# Patient Record
Sex: FEMALE | Race: WHITE | URBAN - METROPOLITAN AREA
[De-identification: names, ages, dates, MRNs, and addresses within clinical notes are randomized per-mention and may not be internally consistent; named-entity substitution may affect disease eponyms.]

---

## 2017-03-03 ENCOUNTER — TRANSFERRED RECORDS (OUTPATIENT)
Dept: HEALTH INFORMATION MANAGEMENT | Facility: CLINIC | Age: 67
End: 2017-03-03

## 2017-04-12 ENCOUNTER — OFFICE VISIT (OUTPATIENT)
Dept: OBGYN | Facility: CLINIC | Age: 67
End: 2017-04-12
Attending: OBSTETRICS & GYNECOLOGY
Payer: MEDICARE

## 2017-04-12 VITALS
SYSTOLIC BLOOD PRESSURE: 138 MMHG | HEIGHT: 67 IN | BODY MASS INDEX: 24.47 KG/M2 | DIASTOLIC BLOOD PRESSURE: 79 MMHG | WEIGHT: 155.9 LBS | HEART RATE: 71 BPM

## 2017-04-12 DIAGNOSIS — Z80.41 FAMILY HISTORY OF MALIGNANT NEOPLASM OF OVARY: Primary | ICD-10-CM

## 2017-04-12 LAB — CANCER AG125 SERPL-ACNC: 7 U/ML (ref 0–30)

## 2017-04-12 PROCEDURE — 86304 IMMUNOASSAY TUMOR CA 125: CPT | Mod: GA | Performed by: OBSTETRICS & GYNECOLOGY

## 2017-04-12 PROCEDURE — 99212 OFFICE O/P EST SF 10 MIN: CPT | Mod: ZF

## 2017-04-12 PROCEDURE — 36415 COLL VENOUS BLD VENIPUNCTURE: CPT | Performed by: OBSTETRICS & GYNECOLOGY

## 2017-04-12 NOTE — PROGRESS NOTES
"[facility] Annual Exam    SUBJECTIVE:  Susan J Kreye is an 67 year old, , who requests an Annual Preventive Exam. Pt denies any new concerns today, she states she is feeling well. She exercises regularly including walking daily, regular light weights, golfing, and leading an active lifestyle in general. She lost her  6 years ago to suicide, but has learned to cope with assistance of therapy and in building a support group with her children and grandchildren. No history of tobacco use and very occasional glass of wine. She has a family history of Ovarian cancer in her mother, who was asymptomatic until she was diagnosed incidentally during an abdominal surgery, and ultimately passed away in her 80's due to the disease. Since this diagnosis, the patient has been undergoing ovarian cancer screening with yearly Ca-125 and pelvic u/s, which have been normal to this point, though she states she is due this year. She has a history of multiple cancers in her family, with her brother with prostate cancer, Maternal grandmother passed from stomach cancer, lung cancer in her father. Genetic testing was discussed on last visit, and the patient today states she is interested in pursuing. She denies any vaginal bleeding, pelvic pain or bloating, GI symptoms, urinary symptoms, cough, chest pain, shortness of breath, any changes with her mood, any SI/HI and denies any other concerns today.     Last    Lab Results   Component Value Date    PAP NIL 01/10/2014     History of abnormal Pap smear: NO - age 63 - see link Cervical Cytology Screening Guidelines, last 3 pap smears were NIL.     Mammogram current: yes  Last Mammogram: Benign mammogram, yearly f/u      Last Colonoscopy:   per pt report w/ no biopsies or concerning findings.     DEXA scan last obtained in , pt currently exercising and supplementing w/ vitamin D and Calcium. Previously she took an \"E\" drug, but cannot remember the name. "     HISTORY:    Current Outpatient Prescriptions on File Prior to Visit:  brimonidine-timolol (COMBIGAN) 0.2-0.5 % ophthalmic solution 1 drop 2 times daily.   Multiple Vitamin (MULTI-VITAMIN PO) Take 1 tablet by mouth daily.   Calcium Carbonate-Vitamin D (CALCIUM + D PO) Take 1,500 mg by mouth daily.   sertraline (ZOLOFT) 50 MG tablet Take 75 mg by mouth daily.     No current facility-administered medications on file prior to visit.   Allergies   Allergen Reactions     Penicillins      Vaginal infections     Immunization History   Administered Date(s) Administered     Influenza (IIV3) 2013     TDAP Vaccine (Adacel) 2014       Obstetric History       T1      TAB0   SAB0   E0   M0   L2      Past Medical History:   Diagnosis Date     Depression      Glaucoma      Migraines      Osteoporosis      Osteoporosis      Past Surgical History:   Procedure Laterality Date     CYSTOCELE REPAIR       EYE SURGERY       OPEN REDUCTION INTERNAL FIXATION ANKLE  2013    Procedure: OPEN REDUCTION INTERNAL FIXATION ANKLE;  OPEN REDUCTION INTERNAL FIXATION LEFT ANKLE TRIMALLEOLAR FRACTURE ;  Surgeon: Rishabh Lantigua MD;  Location:  OR     Family History   Problem Relation Age of Onset     CANCER Mother 83     Ovarian     GASTROINTESTINAL DISEASE Sister      Gallbladder     CANCER Father 60     Lung     CANCER Maternal Grandmother      Bladder     C.A.D. Paternal Grandmother      DIABETES Mother      DM II, controlled with diet     CEREBROVASCULAR DISEASE Paternal Grandfather 88     Prostate Cancer Mother      Prostate Cancer Father      Prostate Cancer Maternal Grandmother      Prostate Cancer Maternal Grandfather      Social History     Social History     Marital status:      Spouse name: N/A     Number of children: N/A     Years of education: N/A     Occupational History           Social History Main Topics     Smoking status: Never Smoker      "Smokeless tobacco: None     Alcohol use 0.0 - 0.5 oz/week     Drug use: No     Sexual activity: No     Other Topics Concern     Blood Transfusions No     Caffeine Concern No     None     Occupational Exposure No     Hobby Hazards No     Sleep Concern No     Stress Concern No     , doing well now     Weight Concern No     Special Diet No     Back Care No     Exercise No     Walks 4x/week     Seat Belt No     Social History Narrative    1/10/14     ( committed suicide in 2010)        How much exercise per week? 4 days    How much calcium per day? supplement       How much caffeine per day? none    How much vitamin D per day? supplement    Do you/your family wear seatbelts?  Yes    Do you/your family use safety helmets? No    Do you/your family use sunscreen? Yes    Do you/your family keep firearms in the home? No    Do you/your family have a smoke detector(s)? Yes        Do you feel safe in your home? Yes    Has anyone ever touched you in an unwanted manner? No     Explain Nan Brown, [facility] 02/04/2015               ROS  ROS negative except for as noted above.       EXAM:  Blood pressure 138/79, pulse 71, height 1.702 m (5' 7\"), weight 70.7 kg (155 lb 14.4 oz). Body mass index is 24.42 kg/(m^2).  General - pleasant female in no acute distress.  Skin - no suspicious lesions or rashes  EENT-  PERRLA, euthyroid with out palpable nodules  Neck - supple without lymphadenopathy.  Lungs - clear to auscultation bilaterally.  Heart - regular rate and rhythm without murmur.  Abdomen - soft, nontender, nondistended, no masses or organomegaly noted.  Musculoskeletal - no gross deformities.  Neurological - normal strength, sensation, and mental status.    Breast Exam:  Breast: Without visible skin changes. No dimpling or lesions seen.   Breasts supple, non-tender with palpation, no dominant mass, nodularity, or nipple discharge noted bilaterally. Axillary nodes negative.      Pelvic Exam:  EG/BUS: Normal genital " architecture without lesions, erythema or abnormal secretions. Normal genital architecture without lesions, erythema or abnormal secretions Bartholin's, Urethra, [place]'s normal   Urethral meatus: normal  Urethra: no masses, tenderness, or scarring  Bladder: no masses or tenderness  Vagina: moist, pink, rugae with creamy, white and odorless  secretions  Cervix: no lesions  Uterus: small, smooth, firm, mobile w/o pain  Adnexa: Within normal limits and No masses, nodularity, tenderness    ASSESSMENT:  Encounter Diagnosis   Name Primary?     Family history of malignant neoplasm of ovary Yes     - Will provide referral to genetic counseling at patient's request to evaluate for risk of hereditary cancer syndromes.    - History of Osteopenia, currently not followed by a physician for this, and is currently exercising and taking Vit D and Calcium. Will refer pt to Dr. Latia Dewey to reestablish care for her osteopenia. Will defer ordering another Dexa scan prior to this meeting.     PLAN:   Orders Placed This Encounter   Procedures     US Pelvic Complete with Transvaginal        - referral to Dr. Latia Dewey  - Referral to genetic counseling [facility]    Return to clinic in one year.  Follow-up as needed.      I, Gavin Centeno, 3rd year medical student am serving as a scribe; to document services personally performed by Dr. Real based on data collection and the provider's statements to me.     The student acted as my scribe. I have seen, examined and counseled the patient. I have reviewed and edited the note.   Sharri Real

## 2017-04-12 NOTE — MR AVS SNAPSHOT
After Visit Summary   4/12/2017    Susan J Kreye    MRN: [id number]           Patient Information     Date Of Birth          1950        Visit Information        Provider Department      4/12/2017 11:00 AM Sharri Real MD [facility]        Today's Diagnoses     Family history of malignant neoplasm of ovary    -  1       Follow-ups after your visit        Additional Services     CANCER RISK MGMT/CANCER GENETIC COUNSELING REFERRAL       Your provider has referred you to the Cancer Risk Management Program - Cancer Genetic Counseling.    Reason for Referral: fam hx ovarian cancer    We have a sent a notice to a staff member of the Cancer Risk Management Program to give you a call to assist with scheduling your appointment.  You may also call  [phone] ([phone]) to initiate scheduling.    Please be aware that coverage of these services is subject to the terms and limitations of your health insurance plan.  Call member services at your health plan with any benefit or coverage questions.      Please bring the completed family history sheet to your appointment in addition to any available outside medical records documenting your cancer diagnosis.                  Who to contact     Please call your clinic at [phone] to:    Ask questions about your health    Make or cancel appointments    Discuss your medicines    Learn about your test results    Speak to your doctor   If you have compliments or concerns about an experience at your clinic, or if you wish to file a complaint, please contact [facility] Patient Relations at [phone] or email us at [email or web address]         Additional Information About Your Visit        MyChart Information     Kwelia is an electronic gateway that provides easy, online access to your medical records. With Kwelia, you can request a clinic appointment, read your test results,  "renew a prescription or communicate with your care team.     To sign up for MyChart visit the website at [email or web address]   You will be asked to enter the access code listed below, as well as some personal information. Please follow the directions to create your username and password.     Your access code is: [email or web address]  Expires: 2017  9:00 AM     Your access code will  in 90 days. If you need help or a new code, please contact your [facility] or call [phone] for assistance.        Care EveryWhere ID     This is your Care EveryWhere ID. This could be used by other organizations to access your [place] medical records  [id number]        Your Vitals Were     Pulse Height BMI (Body Mass Index)             71 1.702 m (5' 7\") 24.42 kg/m2          Blood Pressure from Last 3 Encounters:   17 138/79   02/10/16 131/76   02/04/15 129/71    Weight from Last 3 Encounters:   17 70.7 kg (155 lb 14.4 oz)   02/10/16 68.9 kg (151 lb 12.8 oz)   02/04/15 68.7 kg (151 lb 6.4 oz)              We Performed the Following          CANCER RISK MGMT/CANCER GENETIC COUNSELING REFERRAL        Primary Care Provider    Mulu Amos MD       No address on file        Thank you!     Thank you for choosing [facility]  for your care. Our goal is always to provide you with excellent care. Hearing back from our patients is one way we can continue to improve our services. Please take a few minutes to complete the written survey that you may receive in the mail after your visit with us. Thank you!             Your Updated Medication List - Protect others around you: Learn how to safely use, store and throw away your medicines at www.disposemymeds.org.          This list is accurate as of: 17 11:59 PM.  Always use your most recent med list.                   Brand Name Dispense Instructions for use    brimonidine-timolol 0.2-0.5 % ophthalmic " solution    COMBIGAN     1 drop 2 times daily.       CALCIUM + D PO      Take 1,500 mg by mouth daily.       MULTI-VITAMIN PO      Take 1 tablet by mouth daily.       sertraline 50 MG tablet    ZOLOFT     Take 75 mg by mouth daily.

## 2017-04-12 NOTE — LETTER
2017       RE: Susan J Kreye  [id number] [place] [address]     Dear Colleague,    Thank you for referring your patient, Susan J Kreye, to the [facility] at [facility]. Please see a copy of my visit note below.    [facility] Annual Exam    SUBJECTIVE:  Susan J Kreye is an 67 year old, , who requests an Annual Preventive Exam. Pt denies any new concerns today, she states she is feeling well. She exercises regularly including walking daily, regular light weights, golfing, and leading an active lifestyle in general. She lost her  6 years ago to suicide, but has learned to cope with assistance of therapy and in building a support group with her children and grandchildren. No history of tobacco use and very occasional glass of wine. She has a family history of Ovarian cancer in her mother, who was asymptomatic until she was diagnosed incidentally during an abdominal surgery, and ultimately passed away in her 80's due to the disease. Since this diagnosis, the patient has been undergoing ovarian cancer screening with yearly Ca-125 and pelvic u/s, which have been normal to this point, though she states she is due this year. She has a history of multiple cancers in her family, with her brother with prostate cancer, Maternal grandmother passed from stomach cancer, lung cancer in her father. Genetic testing was discussed on last visit, and the patient today states she is interested in pursuing. She denies any vaginal bleeding, pelvic pain or bloating, GI symptoms, urinary symptoms, cough, chest pain, shortness of breath, any changes with her mood, any SI/HI and denies any other concerns today.     Last    Lab Results   Component Value Date    PAP NIL 01/10/2014     History of abnormal Pap smear: NO - age 63 - see link Cervical Cytology Screening Guidelines, last 3 pap smears were NIL.     Mammogram current: yes  Last Mammogram:  "Benign mammogram, yearly f/u      Last Colonoscopy:   per pt report w/ no biopsies or concerning findings.     DEXA scan last obtained in , pt currently exercising and supplementing w/ vitamin D and Calcium. Previously she took an \"E\" drug, but cannot remember the name.     HISTORY:    Current Outpatient Prescriptions on File Prior to Visit:  brimonidine-timolol (COMBIGAN) 0.2-0.5 % ophthalmic solution 1 drop 2 times daily.   Multiple Vitamin (MULTI-VITAMIN PO) Take 1 tablet by mouth daily.   Calcium Carbonate-Vitamin D (CALCIUM + D PO) Take 1,500 mg by mouth daily.   sertraline (ZOLOFT) 50 MG tablet Take 75 mg by mouth daily.     No current facility-administered medications on file prior to visit.   Allergies   Allergen Reactions     Penicillins      Vaginal infections     Immunization History   Administered Date(s) Administered     Influenza (IIV3) 2013     TDAP Vaccine (Adacel) 2014       Obstetric History       T1      TAB0   SAB0   E0   M0   L2      Past Medical History:   Diagnosis Date     Depression      Glaucoma      Migraines      Osteoporosis      Osteoporosis      Past Surgical History:   Procedure Laterality Date     CYSTOCELE REPAIR  1980     EYE SURGERY       OPEN REDUCTION INTERNAL FIXATION ANKLE  2013    Procedure: OPEN REDUCTION INTERNAL FIXATION ANKLE;  OPEN REDUCTION INTERNAL FIXATION LEFT ANKLE TRIMALLEOLAR FRACTURE ;  Surgeon: Rishabh Lantigua MD;  Location:  OR     Family History   Problem Relation Age of Onset     CANCER Mother 83     Ovarian     GASTROINTESTINAL DISEASE Sister      Gallbladder     CANCER Father 60     Lung     CANCER Maternal Grandmother      Bladder     C.A.D. Paternal Grandmother      DIABETES Mother      DM II, controlled with diet     CEREBROVASCULAR DISEASE Paternal Grandfather 88     Prostate Cancer Mother      Prostate Cancer Father      Prostate Cancer Maternal Grandmother      Prostate Cancer Maternal " "Grandfather      Social History     Social History     Marital status:      Spouse name: N/A     Number of children: N/A     Years of education: N/A     Occupational History           Social History Main Topics     Smoking status: Never Smoker     Smokeless tobacco: None     Alcohol use 0.0 - 0.5 oz/week     Drug use: No     Sexual activity: No     Other Topics Concern     Blood Transfusions No     Caffeine Concern No     None     Occupational Exposure No     Hobby Hazards No     Sleep Concern No     Stress Concern No     , doing well now     Weight Concern No     Special Diet No     Back Care No     Exercise No     Walks 4x/week     Seat Belt No     Social History Narrative    1/10/14     ( committed suicide in 2010)        How much exercise per week? 4 days    How much calcium per day? supplement       How much caffeine per day? none    How much vitamin D per day? supplement    Do you/your family wear seatbelts?  Yes    Do you/your family use safety helmets? No    Do you/your family use sunscreen? Yes    Do you/your family keep firearms in the home? No    Do you/your family have a smoke detector(s)? Yes        Do you feel safe in your home? Yes    Has anyone ever touched you in an unwanted manner? No     Explain Nan Brown, [facility] 02/04/2015               ROS  ROS negative except for as noted above.       EXAM:  Blood pressure 138/79, pulse 71, height 1.702 m (5' 7\"), weight 70.7 kg (155 lb 14.4 oz). Body mass index is 24.42 kg/(m^2).  General - pleasant female in no acute distress.  Skin - no suspicious lesions or rashes  EENT-  PERRLA, euthyroid with out palpable nodules  Neck - supple without lymphadenopathy.  Lungs - clear to auscultation bilaterally.  Heart - regular rate and rhythm without murmur.  Abdomen - soft, nontender, nondistended, no masses or organomegaly noted.  Musculoskeletal - no gross deformities.  Neurological - normal strength, sensation, and mental " status.    Breast Exam:  Breast: Without visible skin changes. No dimpling or lesions seen.   Breasts supple, non-tender with palpation, no dominant mass, nodularity, or nipple discharge noted bilaterally. Axillary nodes negative.      Pelvic Exam:  EG/BUS: Normal genital architecture without lesions, erythema or abnormal secretions. Normal genital architecture without lesions, erythema or abnormal secretions Bartholin's, Urethra, [place]'s normal   Urethral meatus: normal  Urethra: no masses, tenderness, or scarring  Bladder: no masses or tenderness  Vagina: moist, pink, rugae with creamy, white and odorless  secretions  Cervix: no lesions  Uterus: small, smooth, firm, mobile w/o pain  Adnexa: Within normal limits and No masses, nodularity, tenderness    ASSESSMENT:  Encounter Diagnosis   Name Primary?     Family history of malignant neoplasm of ovary Yes     - Will provide referral to genetic counseling at patient's request to evaluate for risk of hereditary cancer syndromes.    - History of Osteopenia, currently not followed by a physician for this, and is currently exercising and taking Vit D and Calcium. Will refer pt to Dr. Latia Dewey to reestablish care for her osteopenia. Will defer ordering another Dexa scan prior to this meeting.     PLAN:   Orders Placed This Encounter   Procedures     US Pelvic Complete with Transvaginal        - referral to Dr. Latia Dewey  - Referral to genetic counseling [facility] cancer center    Return to clinic in one year.  Follow-up as needed.      I, Gavin Centeno, 3rd year medical student am serving as a scribe; to document services personally performed by Dr. Real based on data collection and the provider's statements to me.     The student acted as my scribe. I have seen, examined and counseled the patient. I have reviewed and edited the note.     Sharri Real

## 2017-10-26 ENCOUNTER — ONCOLOGY VISIT (OUTPATIENT)
Dept: ONCOLOGY | Facility: CLINIC | Age: 67
End: 2017-10-26
Attending: GENETIC COUNSELOR, MS
Payer: MEDICARE

## 2017-10-26 DIAGNOSIS — Z80.41 FAMILY HISTORY OF MALIGNANT NEOPLASM OF OVARY: Primary | ICD-10-CM

## 2017-10-26 DIAGNOSIS — Z80.42 FAMILY HISTORY OF PROSTATE CANCER: ICD-10-CM

## 2017-10-26 DIAGNOSIS — Z80.0 FAMILY HISTORY OF STOMACH CANCER: ICD-10-CM

## 2017-10-26 DIAGNOSIS — Z80.8 FAMILY HISTORY OF THYROID CANCER: ICD-10-CM

## 2017-10-26 LAB — MISCELLANEOUS TEST: NORMAL

## 2017-10-26 PROCEDURE — 36415 COLL VENOUS BLD VENIPUNCTURE: CPT

## 2017-10-26 PROCEDURE — 96040 ZZH GENETIC COUNSELING, EACH 30 MINUTES: CPT | Performed by: GENETIC COUNSELOR, MS

## 2017-10-26 NOTE — MR AVS SNAPSHOT
After Visit Summary   10/26/2017    Susan J Kreye    MRN: [id number]           Patient Information     Date Of Birth          1950        Visit Information        Provider Department      10/26/2017 11:15 AM [facility]ellen Moss [facility] Risk Management [facility]        Today's Diagnoses     Family history of malignant neoplasm of ovary    -  1    Family history of prostate cancer        Family history of thyroid cancer        Family history of stomach cancer          Care Instructions          Assessing Cancer Risk  Only about 5-10% of cancers are thought to be due to an inherited cancer susceptibility gene.    These families often have:    Several people with the same or related types of cancer    Cancers diagnosed at a young age (before age 50)    Individuals with more than one primary cancer    Multiple generations of the family affected with cancer    Some people may be candidates for genetic testing of more than one gene.  For these families, genetic testing using a cancer panel may be offered.  These panels can test many genes at once known to increase the risk for gynecologic (and other) cancers:  BRCA1, BRCA2, BRIP1 MLH1, MSH2, MSH6, PMS2, EPCAM, PTEN, PALB2, RAD51C, RAD51D, and TP53. The purpose of this handout is to serve as a brief summary of the gynecologic cancer risk genes that have published clinical management guidelines for individuals who are found to carry a mutation.    ______________________________________________________________________________  Hereditary Breast and Ovarian Cancer Syndrome   (BRCA1 and BRCA2)  A single mutation in one of the copies of BRCA1 or BRCA2 increases the risk for breast and ovarian cancer, among others.  The risk for pancreatic cancer and melanoma may also be slightly increased in some families.  The chart below shows the chance that someone with a BRCA mutation would develop cancer in his or her lifetime1,2,3,4.        A person s ethnic background is also  important to consider, as individuals of Ashkenazi Gnosticism ancestry have a higher chance of having a BRCA gene mutation.  There are three BRCA mutations that occur more frequently in this population.    Taveras Syndrome   (MLH1, MSH2, MSH6, PMS2, and EPCAM)  Currently five genes are known to cause Taveras Syndrome: MLH1, MSH2, MSH6, PMS2, and EPCAM.  A single mutation in one of the Taveras Syndrome genes increases the risk for colon, endometrial, ovarian, and stomach cancers.  Other cancers that occur less commonly in Taveras Syndrome include urinary tract, skin, and brain cancers.  The chart below shows the chance that a person with Taveras syndrome would develop cancer in his or her lifetime7.      *Cancer risk varies depending on Taveras syndrome gene found    Cowden Syndrome   (PTEN)  Cowden syndrome is a hereditary condition that increases the risk for breast, thyroid, endometrial, and kidney cancer.  Cowden syndrome is caused by a mutation in the PTEN gene.  A single mutation in one of the copies of PTEN causes Cowden syndrome and increases cancer risk.  The chart below shows the chance that someone with a PTEN mutation would develop cancer in their lifetime5,6.  Other benign features seen in some individuals with Cowden syndrome include benign skin lesions (facial papules, keratoses, lipomas), learning disability, autism, thyroid nodules, colon polyps, and larger head size.      *One recent study found breast cancer risk to be increased to 85%  Li-Fraumeni Syndrome   (TP53)  Li-Fraumeni Syndrome (LFS) is a cancer predisposition syndrome caused by a mutation in the TP53 gene. A single mutation in one of the copies of TP53 increases the risk for multiple cancers. Individuals with LFS are at an increased risk for developing cancer at a young age. The general lifetime risk for development of cancer is 50% by age 30 and 90% by age 60.     Core Cancers: Sarcomas, Breast, Brain, Lung, Leukemias/Lymphomas, Adrenocortical  carcinomas  Other Cancers: Gastrointestinal, Thyroid, Skin, Genitourinary    Additional Genes  PALB2  Mutations in PALB2 have been shown to increase the risk of breast cancer up to 33-58% in some families; where individuals fall within this risk range is dependent upon family history. PALB2 mutations have also been associated with increased risk for pancreatic cancer, although this risk has not been quantified yet.  Individuals who inherit two PALB2 mutations--one from their mother and one from their father--have a condition called Fanconi Anemia.  This rare autosomal recessive condition is associated with short stature, developmental delay, bone marrow failure, and increased risk for childhood cancers.    BRIP1, RAD51C and RAD51D  Mutations in BRIP1, RAD51C, and RAD51D have been shown to increase the risk of ovarian cancer as well as female breast cancer.    ______________________________________________________________________________    Inheritance  All of the cancer syndromes reviewed above are inherited in an autosomal dominant pattern.  This means that if a parent has a mutation, each of his or her children will have a 50% chance of inheriting that same mutation.  Therefore, each child--male or female--would have a 50% chance of being at increased risk for developing cancer.      Image obtained from Genetics Home Reference, 2013     Mutations in some genes can occur de hanna, which means that a person s mutation occurred for the first time in them and was not inherited from a parent.  Now that they have the mutation, however, it can be passed on to future generations.    Genetic Testing  Genetic testing involves a blood test and will look for any harmful mutations that are associated with increased cancer risk.  If possible, it is recommended that the person(s) who has had cancer be tested before other family members.  That person will give us the most useful information about whether or not a specific gene is  associated with the cancer in the family.    Results  There are three possible results of genetic testing:    Positive--a harmful mutation was identified in one or more of the genes    Negative--no mutation was identified in any of the 9 genes on this panel    Variant of unknown significance--a variation in one of the genes was identified, but it is unclear how this impacts cancer risk in the family    Advantages and Disadvantages   There are advantages and disadvantages to genetic testing.  Advantages    May clarify your cancer risk    Can help you make medical decisions    May explain the cancers in your family    May give useful information to your family members (if you share your results)    Disadvantages    Possible negative emotional impact of learning about inherited cancer risk    Uncertainty in interpreting a negative test result in some situations    Possible genetic discrimination concerns (see below)    Genetic Information Nondiscrimination Act (LUIS)  LUIS is a federal law that protects individuals from health insurance or employment discrimination based on a genetic test result alone.  Although rare, there are currently no legal discrimination protections in terms of life insurance, long term care, or disability insurances.  Visit the National Human Genome Research [place] website to learn more.    Reducing Cancer Risk  Each of the genes listed within this handout have nationally recognized cancer screening guidelines that would be recommended for individuals who test positive.  In addition to increased cancer screening, surgeries may be offered or recommended to reduce cancer risk.  Recommendations are based upon an individual s genetic test result as well as their personal and family history of cancer.    Questions to Think About Regarding Genetic Testing:    What effect will the test result have on me and my relationship with my family members if I have an inherited gene mutation?  If I don t  have a gene mutation?    Should I share my test results, and how will my family react to this news, which may also affect them?    Are my children ready to learn new information that may one day affect their own health?        Hereditary Cancer Resources    FORCE: Facing Our Risk of Cancer Empowered [email or web address]   Bright Pink bebrightpink.org   Li-Fraumeni Syndrome Association lfsassociation.org   PTEN World PTENworld.com   Collaborative Group of the Americas on Inherited Colorectal Cancer (CGA) cgaicc.com http://www.facingourrisk.org/   Cancer Care cancercare.org   American Cancer Society (ACS) cancer.org   National Cancer [place] (NCI) cancer.gov       Cancer Risk Management Program [phone] ([phone])  ? [place] [facility] [place]  [phone]  ? Nan [place]cadio[place] [facility] [place]  [phone]  ? Beckie [place] [facility] [place]  [phone]  ? [place] [facility] [place]  [phone]   ? [place]ina Hutchison[place] [facility] [place]  [phone]    References  1. Raimundo A, Whitney PDP, Narsara S, Risch KHAN, Marlena JE, Rohini JL, Maryman N, Edith H, Maura O, Lisa A, Mednez B, Susan P, Bimal S, Jonathan DM, Jose N, Héctor E, Yanira H, Moon E, Lubinski J, Gronwald J, Tamika B, Mason H, Thorlacius S, Eerola H, Kt H, Terry K, Laura OP. Average risks of breast and ovarian cancer associated with BRCA1 or BRCA2 mutations detected in case series unselected for family history: a combined analysis of 222 studies. Am J Hum Elsa. 2003;72:1117-30.  2. Neva LE, Fang HAMILTON, Gerardo G.  BRCA1 and BRCA2 Hereditary Breast and Ovarian Cancer. Gene Reviews online. 2013.  3. Ralph YC, Gustavo S, Hetal G, Mendez S. Breast cancer risk among male BRCA1 and BRCA2 mutation carriers. J Natl Cancer Inst. 2007;99:1811-4.  4. Robert DG, Vivienne I, Justyn J, Dorcas E, Marivel ER, Lakshmi F. Risk of breast cancer in male BRCA2 carriers. J Med Elsa. 2010;47:710-1.  5. Rebel LAINEZ, Grady J, Ranulfo J, Michael JARVIS, Lissett PHILLIPS, Tammy C. Lifetime cancer  "risks in individuals with germline PTEN mutations. Clin Cancer Res. 2012;18:400-7.  6. Shane CARTAGENA. Cowden Syndrome: A Critical Review of the Clinical Literature. J Elsa . 2009:18:13-27.  7. National Comprehensive Cancer Network. Clinical practice guidelines in oncology, colorectal cancer screening. Available online (registration required). 2015.  8. Raimundo HEWITT et al. Breast-Cancer Risk in Families with Mutations in PALB2. NEJM. 2014; 371(6):497-506.                Follow-ups after your visit        Who to contact     If you have questions or need follow up information about today's clinic visit or your schedule please contact CANCER RISK MANAGEMENT PROGRAM directly at [phone].  Normal or non-critical lab and imaging results will be communicated to you by [employer], letter or phone within 4 business days after the clinic has received the results. If you do not hear from us within 7 days, please contact the clinic through [employer] or phone. If you have a critical or abnormal lab result, we will notify you by phone as soon as possible.  Submit refill requests through imoji or call your pharmacy and they will forward the refill request to us. Please allow 3 business days for your refill to be completed.          Additional Information About Your Visit        imoji Information     imoji lets you send messages to your doctor, view your test results, renew your prescriptions, schedule appointments and more. To sign up, go to [email or web address] . Click on \"Log in\" on the left side of the screen, which will take you to the Welcome page. Then click on \"Sign up Now\" on the right side of the page.     You will be asked to enter the access code listed below, as well as some personal information. Please follow the directions to create your username and password.     Your access code is: [email or web address]  Expires: 2018 12:20 PM     Your access code will  in 90 days. If you need help or a new code, " please call your [place] clinic or [phone].        Care EveryWhere ID     This is your Care EveryWhere ID. This could be used by other organizations to access your [place] medical records  [id number]         Blood Pressure from Last 3 Encounters:   04/12/17 138/79   02/10/16 131/76   02/04/15 129/71    Weight from Last 3 Encounters:   04/12/17 70.7 kg (155 lb 14.4 oz)   02/10/16 68.9 kg (151 lb 12.8 oz)   02/04/15 68.7 kg (151 lb 6.4 oz)              We Performed the Following     [facility]: Laboratory Miscellaneous Order        Primary Care Provider    None Specified       No primary provider on file.        Equal Access to Services     CRISTA SAUER : Hadsweta Rodríguez, wajorge lda luanamadaha, qaybta kaalmada jackyyasyeda, shabana méndez . So [facility] [phone].    ATENCIÓN: Si habla español, tiene a laguerre disposición servicios gratuitos de asistencia lingüística. Llame al [phone].    We comply with applicable federal civil rights laws and [place] laws. We do not discriminate on the basis of race, color, national origin, age, disability, sex, sexual orientation, or gender identity.            Thank you!     Thank you for choosing CANCER RISK MANAGEMENT PROGRAM  for your care. Our goal is always to provide you with excellent care. Hearing back from our patients is one way we can continue to improve our services. Please take a few minutes to complete the written survey that you may receive in the mail after your visit with us. Thank you!             Your Updated Medication List - Protect others around you: Learn how to safely use, store and throw away your medicines at www.disposemymeds.org.          This list is accurate as of: 10/26/17 12:20 PM.  Always use your most recent med list.                   Brand Name Dispense Instructions for use Diagnosis    brimonidine-timolol 0.2-0.5 % ophthalmic solution    COMBIGAN     1 drop 2 times daily.        CALCIUM + D PO       Take 1,500 mg by mouth daily.        MULTI-VITAMIN PO      Take 1 tablet by mouth daily.        sertraline 50 MG tablet    ZOLOFT     Take 75 mg by mouth daily.

## 2017-10-26 NOTE — PATIENT INSTRUCTIONS
Assessing Cancer Risk  Only about 5-10% of cancers are thought to be due to an inherited cancer susceptibility gene.    These families often have:    Several people with the same or related types of cancer    Cancers diagnosed at a young age (before age 50)    Individuals with more than one primary cancer    Multiple generations of the family affected with cancer    Some people may be candidates for genetic testing of more than one gene.  For these families, genetic testing using a cancer panel may be offered.  These panels can test many genes at once known to increase the risk for gynecologic (and other) cancers:  BRCA1, BRCA2, BRIP1 MLH1, MSH2, MSH6, PMS2, EPCAM, PTEN, PALB2, RAD51C, RAD51D, and TP53. The purpose of this handout is to serve as a brief summary of the gynecologic cancer risk genes that have published clinical management guidelines for individuals who are found to carry a mutation.    ______________________________________________________________________________  Hereditary Breast and Ovarian Cancer Syndrome   (BRCA1 and BRCA2)  A single mutation in one of the copies of BRCA1 or BRCA2 increases the risk for breast and ovarian cancer, among others.  The risk for pancreatic cancer and melanoma may also be slightly increased in some families.  The chart below shows the chance that someone with a BRCA mutation would develop cancer in his or her lifetime1,2,3,4.        A person s ethnic background is also important to consider, as individuals of Ashkenazi [place] ancestry have a higher chance of having a BRCA gene mutation.  There are three BRCA mutations that occur more frequently in this population.    Taveras Syndrome   (MLH1, MSH2, MSH6, PMS2, and EPCAM)  Currently five genes are known to cause Taveras Syndrome: MLH1, MSH2, MSH6, PMS2, and EPCAM.  A single mutation in one of the Taveras Syndrome genes increases the risk for colon, endometrial, ovarian, and stomach cancers.  Other cancers that occur  less commonly in Tavreas Syndrome include urinary tract, skin, and brain cancers.  The chart below shows the chance that a person with Taveras syndrome would develop cancer in his or her lifetime7.      *Cancer risk varies depending on Taveras syndrome gene found    Cowden Syndrome   (PTEN)  Cowden syndrome is a hereditary condition that increases the risk for breast, thyroid, endometrial, and kidney cancer.  Cowden syndrome is caused by a mutation in the PTEN gene.  A single mutation in one of the copies of PTEN causes Cowden syndrome and increases cancer risk.  The chart below shows the chance that someone with a PTEN mutation would develop cancer in their lifetime5,6.  Other benign features seen in some individuals with Cowden syndrome include benign skin lesions (facial papules, keratoses, lipomas), learning disability, autism, thyroid nodules, colon polyps, and larger head size.      *One recent study found breast cancer risk to be increased to 85%  Li-Fraumeni Syndrome   (TP53)  Li-Fraumeni Syndrome (LFS) is a cancer predisposition syndrome caused by a mutation in the TP53 gene. A single mutation in one of the copies of TP53 increases the risk for multiple cancers. Individuals with LFS are at an increased risk for developing cancer at a young age. The general lifetime risk for development of cancer is 50% by age 30 and 90% by age 60.     Core Cancers: Sarcomas, Breast, Brain, Lung, Leukemias/Lymphomas, Adrenocortical carcinomas  Other Cancers: Gastrointestinal, Thyroid, Skin, Genitourinary    Additional Genes  PALB2  Mutations in PALB2 have been shown to increase the risk of breast cancer up to 33-58% in some families; where individuals fall within this risk range is dependent upon family history. PALB2 mutations have also been associated with increased risk for pancreatic cancer, although this risk has not been quantified yet.  Individuals who inherit two PALB2 mutations--one from their mother and one from their  father--have a condition called Fanconi Anemia.  This rare autosomal recessive condition is associated with short stature, developmental delay, bone marrow failure, and increased risk for childhood cancers.    BRIP1, RAD51C and RAD51D  Mutations in BRIP1, RAD51C, and RAD51D have been shown to increase the risk of ovarian cancer as well as female breast cancer.    ______________________________________________________________________________    Inheritance  All of the cancer syndromes reviewed above are inherited in an autosomal dominant pattern.  This means that if a parent has a mutation, each of his or her children will have a 50% chance of inheriting that same mutation.  Therefore, each child--male or female--would have a 50% chance of being at increased risk for developing cancer.      Image obtained from Genetics Home Reference, 2013     Mutations in some genes can occur de hanna, which means that a person s mutation occurred for the first time in them and was not inherited from a parent.  Now that they have the mutation, however, it can be passed on to future generations.    Genetic Testing  Genetic testing involves a blood test and will look for any harmful mutations that are associated with increased cancer risk.  If possible, it is recommended that the person(s) who has had cancer be tested before other family members.  That person will give us the most useful information about whether or not a specific gene is associated with the cancer in the family.    Results  There are three possible results of genetic testing:    Positive--a harmful mutation was identified in one or more of the genes    Negative--no mutation was identified in any of the 9 genes on this panel    Variant of unknown significance--a variation in one of the genes was identified, but it is unclear how this impacts cancer risk in the family    Advantages and Disadvantages   There are advantages and disadvantages to genetic  testing.  Advantages    May clarify your cancer risk    Can help you make medical decisions    May explain the cancers in your family    May give useful information to your family members (if you share your results)    Disadvantages    Possible negative emotional impact of learning about inherited cancer risk    Uncertainty in interpreting a negative test result in some situations    Possible genetic discrimination concerns (see below)    Genetic Information Nondiscrimination Act (LUIS)  LUIS is a federal law that protects individuals from health insurance or employment discrimination based on a genetic test result alone.  Although rare, there are currently no legal discrimination protections in terms of life insurance, long term care, or disability insurances.  Visit the [employer] [place] website to learn more.    Reducing Cancer Risk  Each of the genes listed within this handout have nationally recognized cancer screening guidelines that would be recommended for individuals who test positive.  In addition to increased cancer screening, surgeries may be offered or recommended to reduce cancer risk.  Recommendations are based upon an individual s genetic test result as well as their personal and family history of cancer.    Questions to Think About Regarding Genetic Testing:    What effect will the test result have on me and my relationship with my family members if I have an inherited gene mutation?  If I don t have a gene mutation?    Should I share my test results, and how will my family react to this news, which may also affect them?    Are my children ready to learn new information that may one day affect their own health?        Hereditary Cancer Resources    FORCE: Facing Our Risk of Cancer Empowered [email or web address]   Bright Pink bebrightpink.org   Li-Fraumeni Syndrome Association lfsassociation.org   PTEN World PTENworld.com   Collaborative Group of the Americas on Inherited  Colorectal Cancer (CGA) [email or web address]   Cancer Care [email or web address]   American Cancer Society (ACS) cancer.org   National Cancer [place] (NCI) cancer.gov       Cancer Risk Management Program [phone]-CANCER ([phone])  ? Mitra Loaiza, [place], [facility] [place]  [phone]  ? Nan Ervin, MS, [facility] [place]  [phone]  ? Beckie Masters, [place], [facility] [place]  [phone]  ? Suellen Moss, [place], [facility] [place]  [phone]   ? Medina Hutchison, MS, [facility]  [phone]    References  1. Raimundo A, Whitney PDP, Praful S, Inez KHAN, Marlena JE, Rohini JL, Jasper N, Edith H, Maura O, Lisa A, Mendez B, Susan P, Manalonso S, Jonathan DM, Jose N, Héctor E, Yanira H, Red E, Kasandra J, Kristel J, Tamika B, Mason H, Thorlacius S, Eerola H, Nevryliena H, Terry K, Laura OP. Average risks of breast and ovarian cancer associated with BRCA1 or BRCA2 mutations detected in case series unselected for family history: a combined analysis of 222 studies. Am J Hum Elsa. 2003;72:1117-30.  2. Neva N, Fang M, Carrillo G.  BRCA1 and BRCA2 Hereditary Breast and Ovarian Cancer. Gene Reviews online. 2013.  3. Ralph YC, Gustavo S, Hetal G, Mendez S. Breast cancer risk among male BRCA1 and BRCA2 mutation carriers. J Natl Cancer Inst. 2007;99:1811-4.  4. Robert DG, Vivienne I, Justyn J, Dorcas E, Marivel ER, Lakshmi F. Risk of breast cancer in male BRCA2 carriers. J Med Elsa. 2010;47:710-1.  5. Rebel MH, Grady J, Ranulfo J, Michael LA, Lissett MS, Eng C. Lifetime cancer risks in individuals with germline PTEN mutations. Clin Cancer Res. 2012;18:400-7.  6. Shane CARTAGENA. Cowden Syndrome: A Critical Review of the Clinical Literature. J Elsa . 2009:18:13-27.  7. National Comprehensive Cancer Network. Clinical practice guidelines in oncology, colorectal cancer screening. Available online (registration required). 2015.  8. Raimundo HEWITT et al. Breast-Cancer Risk in Families with Mutations in PALB2. NEJM. 2014; 371(6):497-506.

## 2017-10-26 NOTE — PROGRESS NOTES
Medical Assistant Note:  Susan J Kreye presents today for labs.    Patient seen by provider today: Yes: .   present during visit today: Not Applicable.    Concerns: No Concerns.    Procedure:  Lab draw site: LAC, Needle type: BF, Gauge: 21.    Post Assessment:  Labs drawn without difficulty: Yes.    Discharge Plan:  Departure Mode: Ambulatory.    Face to Face Time: 5 minutes.    Haydee Nguyen MA

## 2017-10-26 NOTE — LETTER
Cancer Risk Management  Program Locations    [facility] Cancer [facility] Cancer [facility]  Mailing Address  Cancer Risk Management Program  [facility]  [address]  [place] [address]    New patient appointments  [phone]  October 31, 2017    Susan J Kreye  [id number]   [place] [address]      Dear Cheryl,    It was a pleasure meeting with you at the [facility] on 10/26/2017.  Here is a copy of the progress note from your recent genetic counseling visit to the Cancer Risk Management Program.  If you have any additional questions, please feel free to call.    Referring Provider: Sharri Real MD    Presenting Information:   I met with Cheryl Casanovamarino today for genetic counseling at the Cancer Risk Management Program at the [facility] to discuss her family history of ovarian cancer.  She is here today to review this history, cancer screening recommendations, and available genetic testing options.    Personal History:  Cheryl is a 67 year old female.  She does not have any personal history of cancer.      She had her first menstrual period at age 11, her first child at age 35, and went through menopause in her mid 50's.  Cheryl has her ovaries, fallopian tubes and uterus in place, and she has had annual ovarian cancer screening including CA-125 and transvaginal ultrasounds (most recent in April of 2017 were normal).  She reports a history of estrogen hormone replacement therapy for 5-10 years.      Cheryl's most recent OB-GYN exam and Pap smear in 2017 were normal.  She has annual clinical breast exams and mammograms, and her most recent mammogram in March of 2017 was benign.  Her most recent colonoscopy reportedly within the past 5 years was normal, and follow-up was recommended in 10 years.  She reports  no history of colon polyps. Cheryl reports seeing a dermatologist annually, and has a history of one benign mole.  Cheryl reported no tobacco use.    Family History: (Please see scanned pedigree for detailed family history information)    Cheryl's daughter was diagnosed with papillary thyroid cancer recently at age 32    Her brother was diagnosed with prostate cancer at 54 and is now 58    Her mother was diagnosed with ovarian cancer at age 82 and passed away at 87    Her maternal grandmother was diagnosed with reportedly stomach cancer in her 30's and passed away at 96    Her maternal grandfather was diagnosed with prostate cancer in his 90's and has passed away    Cheryl's father had a history of lung cancer and passed away at 60.  He had significant exposure to smoking and asbestos.      Her maternal ethnicity is [place] and possibly Ashkenazi Protestant. Her paternal ethnicity is [place] and Polish.     Discussion:    Cheryl's family history of ovarian cancer may be suggestive of a possible cancer susceptibility gene in the family. Given her mother's history of ovarian cancer, we discussed Taveras syndrome and Hereditary Breast and Ovarian Cancer syndrome as the two most likely hereditary explanations.    We reviewed the features of sporadic, familial, and hereditary cancers.  Based on her family history, Cheryl meets current National Comprehensive Cancer Network (NCCN) criteria for genetic testing of BRCA1 and BRCA2.    We discussed the natural history and genetics of Hereditary Breast and Ovarian cancer syndrome due to BRCA1 and BRCA2 gene mutations. We also discussed the natural history and genetics of Taveras syndrome due to mismatch repair gene mutations (MLH1, MSH2, MSH6, PMS2, EPCAM). A detailed handout regarding hereditary gynecologic cancer risk genes and the information we discussed was provided to Cheryl at the end of our appointment today and can be found in the after visit summary.  Topics included: inheritance  pattern, cancer risks, cancer screening recommendations, and also risks, benefits and limitations of testing.    We discussed that there are additional genes that could cause increased risk for ovarian cancer.  As many of these genes present with overlapping features in a family and accurate cancer risk cannot always be established based upon the pedigree analysis alone, it would be reasonable for Cheryl to consider panel genetic testing to analyze multiple genes at once.    We discussed that genetic testing for ovarian cancer susceptibility genes is typically most informative when it is first performed on a family member with a personal history of ovarian or a related cancer. Testing is available to Cheryl, but with limitations. If Cheryl pursues testing at this time and receives a negative result, this does not rule out the possibility of a hereditary cancer syndrome in her and/or her family. Despite these limitations, Cheryl expressed interest in proceeding with testing.    We reviewed genetic testing options for hereditary gynecologic cancers: actionable high/moderate risk panel testing (GYNplus, 13 genes) and expanded high and moderate risk panel testing (OvaNext, 25 genes).  Cheryl expressed an interest in learning as much information as possible from the testing. She opted for the OvaNext panel.  Genetic testing is available for 25 genes associated with hereditary gynecologic cancers: OvaNext (ASHLEY, BARD1, BRCA1, BRCA2, BRIP1, CDH1, CHEK2, DICER1, EPCAM, MLH1, MRE11A, MSH2, MSH6, MUTYH, NBN, NF1, PALB2, PMS2, PTEN, RAD50, RAD51C, RAD51D, SMARCA4, STK11, and TP53).  We discussed that some of the genes in the OvaNext panel are associated with specific hereditary cancer syndromes and have published management guidelines: Hereditary Breast and Ovarian Cancer syndrome (BRCA1, BRCA2), Taveras syndrome (MLH1, MSH2, MSH6, PMS2, EPCAM), Hereditary Diffuse Gastric Cancer (CDH1), Cowden syndrome (PTEN), Li Fraumeni syndrome  (TP53), Peutz-Jeghers syndrome (STK11), MUTYH Associated Polyposis syndrome (MUTYH), and Neurofibromatosis type 1 (NF1).    Risk-reducing salpingo-oophorectomy can be considered in women with mutations in BRIP1, RAD51C, or RAD51D.  Breast cancer risk management guidelines are available for ASHLEY, CHEK2, PALB2, NF1, and NBN.  The remaining genes (BARD1, DICER1, MRE11A, RAD50, and SMARCA4) are associated with increased cancer risk and may allow us to make medical recommendations when mutations are identified.    Cheryl was provided with a detailed brochure from [facility] explaining the OvaNext testing.  Consent was obtained and genetic testing for OvaNext was sent to [facility]. Turn around time: approximately 3-4 weeks.    Medical Management: For Cheryl, we reviewed that the information from genetic testing may determine:    additional cancer screening for which Cheryl may qualify (i.e. mammogram and breast MRI, more frequent colonoscopies, more frequent dermatologic exams, etc.),    options for risk reducing surgeries Cheryl could consider (i.e. bilateral mastectomy, surgery to remove the ovaries and/or uterus, etc.)      These recommendations will be discussed in detail once genetic testing is completed.     Plan:  1) Today Cheryl elected to proceed with the OvaNext panel offered through [facility].  2) This information should be available in 3-4 weeks.  3) Cheryl will return to clinic to discuss the results    Suellen Moss MS, [facility]  Certified Genetic Counselor  [phone]

## 2017-10-26 NOTE — LETTER
10/26/2017         RE: Susan J Kreye  [id number]   [place] [address]        Dear Colleague,    Thank you for referring your patient, Susan J Kreye, to the CANCER RISK MANAGEMENT PROGRAM. Please see a copy of my visit note below.    10/26/2017    Referring Provider: Sharri Real MD    Presenting Information:   I met with Susan Kreye today for genetic counseling at the Cancer Risk Management Program at the [facility] to discuss her family history of ovarian cancer.  She is here today to review this history, cancer screening recommendations, and available genetic testing options.    Personal History:  Cheryl is a 67 year old female.  She does not have any personal history of cancer.      She had her first menstrual period at age 11, her first child at age 35, and went through menopause in her mid 50's.  Cheryl has her ovaries, fallopian tubes and uterus in place, and she has had annual ovarian cancer screening including CA-125 and transvaginal ultrasounds (most recent in April of 2017 were normal).  She reports a history of estrogen hormone replacement therapy for 5-10 years.      Cheryl's most recent OB-GYN exam and Pap smear in 2017 were normal.  She has annual clinical breast exams and mammograms, and her most recent mammogram in March of 2017 was benign.  Her most recent colonoscopy reportedly within the past 5 years was normal, and follow-up was recommended in 10 years.  She reports no history of colon polyps. Cheryl reports seeing a dermatologist annually, and has a history of one benign mole.  Cheryl reported no tobacco use.    Family History: (Please see scanned pedigree for detailed family history information)    Cheryl's daughter was diagnosed with papillary thyroid cancer recently at age 32    Her brother was diagnosed with prostate cancer at 54 and is now 58    Her mother was diagnosed with ovarian cancer at age 82 and passed away at 87    Her maternal grandmother was  diagnosed with reportedly stomach cancer in her 30's and passed away at 96    Her maternal grandfather was diagnosed with prostate cancer in his 90's and has passed away    Cheryl's father had a history of lung cancer and passed away at 60.  He had significant exposure to smoking and asbestos.      Her maternal ethnicity is [place]ian and possibly Ashkenazi Sabianist. Her paternal ethnicity is [place]ian and Polish.     Discussion:    Cheryl's family history of ovarian cancer may be suggestive of a possible cancer susceptibility gene in the family. Given her mother's history of ovarian cancer, we discussed Taveras syndrome and Hereditary Breast and Ovarian Cancer syndrome as the two most likely hereditary explanations.    We reviewed the features of sporadic, familial, and hereditary cancers.  Based on her family history, Cheryl meets current National Comprehensive Cancer Network (NCCN) criteria for genetic testing of BRCA1 and BRCA2.    We discussed the natural history and genetics of Hereditary Breast and Ovarian cancer syndrome due to BRCA1 and BRCA2 gene mutations. We also discussed the natural history and genetics of Taveras syndrome due to mismatch repair gene mutations (MLH1, MSH2, MSH6, PMS2, EPCAM). A detailed handout regarding hereditary gynecologic cancer risk genes and the information we discussed was provided to Cheryl at the end of our appointment today and can be found in the after visit summary.  Topics included: inheritance pattern, cancer risks, cancer screening recommendations, and also risks, benefits and limitations of testing.    We discussed that there are additional genes that could cause increased risk for ovarian cancer.  As many of these genes present with overlapping features in a family and accurate cancer risk cannot always be established based upon the pedigree analysis alone, it would be reasonable for Cheryl to consider panel genetic testing to analyze multiple genes at once.    We discussed that  genetic testing for ovarian cancer susceptibility genes is typically most informative when it is first performed on a family member with a personal history of ovarian or a related cancer. Testing is available to Cheryl, but with limitations. If Cheryl pursues testing at this time and receives a negative result, this does not rule out the possibility of a hereditary cancer syndrome in her and/or her family. Despite these limitations, Cheryl expressed interest in proceeding with testing.    We reviewed genetic testing options for hereditary gynecologic cancers: actionable high/moderate risk panel testing (GYNplus, 13 genes) and expanded high and moderate risk panel testing (OvaNext, 25 genes).  Cheryl expressed an interest in learning as much information as possible from the testing. She opted for the OvaNext panel.  Genetic testing is available for 25 genes associated with hereditary gynecologic cancers: OvaNext (ASHLEY, BARD1, BRCA1, BRCA2, BRIP1, CDH1, CHEK2, DICER1, EPCAM, MLH1, MRE11A, MSH2, MSH6, MUTYH, NBN, NF1, PALB2, PMS2, PTEN, RAD50, RAD51C, RAD51D, SMARCA4, STK11, and TP53).  We discussed that some of the genes in the OvaNext panel are associated with specific hereditary cancer syndromes and have published management guidelines: Hereditary Breast and Ovarian Cancer syndrome (BRCA1, BRCA2), Taveras syndrome (MLH1, MSH2, MSH6, PMS2, EPCAM), Hereditary Diffuse Gastric Cancer (CDH1), Cowden syndrome (PTEN), Li Fraumeni syndrome (TP53), Peutz-Jeghers syndrome (STK11), MUTYH Associated Polyposis syndrome (MUTYH), and Neurofibromatosis type 1 (NF1).    Risk-reducing salpingo-oophorectomy can be considered in women with mutations in BRIP1, RAD51C, or RAD51D.  Breast cancer risk management guidelines are available for ASHLEY, CHEK2, PALB2, NF1, and NBN.  The remaining genes (BARD1, DICER1, MRE11A, RAD50, and SMARCA4) are associated with increased cancer risk and may allow us to make medical recommendations when mutations are  identified.    Cheryl was provided with a detailed brochure from [facility] explaining the OvaNext testing.  Consent was obtained and genetic testing for OvaNext was sent to [facility]. Turn around time: approximately 3-4 weeks.    Medical Management: For Cheryl, we reviewed that the information from genetic testing may determine:    additional cancer screening for which Cheryl may qualify (i.e. mammogram and breast MRI, more frequent colonoscopies, more frequent dermatologic exams, etc.),    options for risk reducing surgeries Cheryl could consider (i.e. bilateral mastectomy, surgery to remove the ovaries and/or uterus, etc.)      These recommendations will be discussed in detail once genetic testing is completed.     Plan:  1) Today Cheryl elected to proceed with the OvaNext panel offered through [facility].  2) This information should be available in 3-4 weeks.  3) Cheryl will return to clinic to discuss the results    Face to face time: 45 minutes    Suellen Moss MS, [facility]  Certified Genetic Counselor  [phone]                Medical Assistant Note:  Susan J Kreye presents today for labs.    Patient seen by provider today: Yes: .   present during visit today: Not Applicable.    Concerns: No Concerns.    Procedure:  Lab draw site: LAC, Needle type: BF, Gauge: 21.    Post Assessment:  Labs drawn without difficulty: Yes.    Discharge Plan:  Departure Mode: Ambulatory.    Face to Face Time: 5 minutes.    Haydee Nguyen MA              Again, thank you for allowing me to participate in the care of your patient.        Sincerely,        Suellen Moss GC

## 2017-11-19 LAB — LAB SCANNED RESULT: ABNORMAL

## 2017-11-21 ENCOUNTER — THERAPY VISIT (OUTPATIENT)
Dept: PHYSICAL THERAPY | Facility: CLINIC | Age: 67
End: 2017-11-21
Payer: MEDICARE

## 2017-11-21 DIAGNOSIS — M77.8 SHOULDER TENDONITIS, RIGHT: Primary | ICD-10-CM

## 2017-11-21 DIAGNOSIS — S76.001S: ICD-10-CM

## 2017-11-21 PROCEDURE — G8978 MOBILITY CURRENT STATUS: HCPCS | Mod: GP

## 2017-11-21 PROCEDURE — 97110 THERAPEUTIC EXERCISES: CPT | Mod: GP

## 2017-11-21 PROCEDURE — G8979 MOBILITY GOAL STATUS: HCPCS | Mod: GP

## 2017-11-21 PROCEDURE — 97161 PT EVAL LOW COMPLEX 20 MIN: CPT | Mod: GP

## 2017-11-21 ASSESSMENT — ACTIVITIES OF DAILY LIVING (ADL)
STEPPING_UP_AND_DOWN_CURBS: SLIGHT DIFFICULTY
WALKING_DOWN_STEEP_HILLS: SLIGHT DIFFICULTY
SITTING_FOR_15_MINUTES: NO DIFFICULTY AT ALL
HEAVY_WORK: MODERATE DIFFICULTY
STANDING_FOR_15_MINUTES: NO DIFFICULTY AT ALL
HOS_ADL_SCORE(%): 70.31
HOW_WOULD_YOU_RATE_YOUR_CURRENT_LEVEL_OF_FUNCTION_DURING_YOUR_USUAL_ACTIVITIES_OF_DAILY_LIVING_FROM_0_TO_100_WITH_100_BEING_YOUR_LEVEL_OF_FUNCTION_PRIOR_TO_YOUR_HIP_PROBLEM_AND_0_BEING_THE_INABILITY_TO_PERFORM_ANY_OF_YOUR_USUAL_DAILY_ACTIVITIES?: 50
WALKING_UP_STEEP_HILLS: SLIGHT DIFFICULTY
WALKING_APPROXIMATELY_10_MINUTES: SLIGHT DIFFICULTY
WALKING_INITIALLY: NO DIFFICULTY AT ALL
HOS_ADL_ITEM_SCORE_TOTAL: 45
PUTTING_ON_SOCKS_AND_SHOES: NO DIFFICULTY AT ALL
RECREATIONAL_ACTIVITIES: MODERATE DIFFICULTY
DEEP_SQUATTING: EXTREME DIFFICULTY
ROLLING_OVER_IN_BED: SLIGHT DIFFICULTY
GETTING_INTO_AND_OUT_OF_AN_AVERAGE_CAR: SLIGHT DIFFICULTY
TWISTING/PIVOTING_ON_INVOLVED_LEG: SLIGHT DIFFICULTY
HOS_ADL_COUNT: 16
LIGHT_TO_MODERATE_WORK: NO DIFFICULTY AT ALL
HOS_ADL_HIGHEST_POTENTIAL_SCORE: 64
GOING_DOWN_1_FLIGHT_OF_STAIRS: MODERATE DIFFICULTY
WALKING_15_MINUTES_OR_GREATER: SLIGHT DIFFICULTY
GOING_UP_1_FLIGHT_OF_STAIRS: MODERATE DIFFICULTY

## 2017-11-21 NOTE — PROGRESS NOTES
Subjective:    Patient is a 67 year old female presenting with rehab right hip hpi and rehab right shoulder hpi.   Susan J Kreye is a 67 year old female with a right hip condition.  Condition occurred with:  Insidious onset.  Condition occurred: for unknown reasons.  This is a new condition  Onset: 10/21/17.  Thinks it might be due to going up and down stairs with 1 year old grand daughter.  Feels pulling along outside of right leg; also hurts at night ans steps; .      Radiates to:  Hip and gluteals.  Pain is described as aching and is intermittent and reported as 5/10.     Symptoms are exacerbated by ascending stairs, descending stairs, lying on extremity and bending/squatting and relieved by analgesics and rest.  Since onset symptoms are gradually worsening.  Special tests:  X-ray.                                  Susan J Kreye is a 67 year old female with a right shoulder condition.        Onset: July 1, 2017.  Thinks it might be due to golfing but also picking up grand-daughter.  CC: aches; posterior shoulder.  Especially with raising arm; .    Patient reports pain:  Posterior.  Radiates to:  Shoulder.  Pain is described as aching and is intermittent and reported as 5/10.   Worse during: N/A.  Symptoms are exacerbated by lifting, using arm at shoulder level, certain positions, using arm behind back, carrying and lying on extremity and relieved by analgesics (avoidance).  Since onset symptoms are unchanged.  Special tests:  X-ray.                                                    Objective:    System                   Shoulder Evaluation:  ROM:  AROM:    Flexion:  Left:  170    Right:  130    Abduction:  Right:  125    Internal Rotation:  Right:  45  External Rotation:  Right:  110                      Strength:    Flexion: Left:5-/5   Pain:    Right: 4+/5      Pain:  +    Abduction:  Left: 5-/5  Pain:    Right: 4+/5      Pain:+    Internal Rotation:  Left:5-/5     Pain:    Right: 5-/5     Pain:  External  Rotation:   Left:5-/5     Pain:   Right:4+/5      Pain:+              Special Tests:  Special tests assessed shoulder: Possible RCT.      Palpation:  Palpation assessed shoulder: TTP coracoid process.    Right shoulder tenderness present at: Infraspinatus and Teres Minor                          Hip Evaluation  HIP AROM:  : WNL- but pain at end range R hip ER.                                     General     ROS    Assessment/Plan:      Patient is a 67 year old female with right side shoulder and right side hip complaints.    Patient has the following significant findings with corresponding treatment plan.                Diagnosis 1:  R shoulder pain with S&Sx suggesting possible RCT  Decreased ROM/flexibility - manual therapy and therapeutic exercise  Decreased strength - therapeutic exercise and therapeutic activities  Impaired muscle performance - neuro re-education  Decreased function - therapeutic activities  Diagnosis 2:  R lateral hip pain, s&sx consistent with possible gluteal tendonopathy and bursitis and associated proximal weakness   Pain -  US, self management, education and home program  Decreased strength - therapeutic exercise and therapeutic activities  Impaired muscle performance - neuro re-education  Decreased function - therapeutic activities    Therapy Evaluation Codes:   1) History comprised of:   Personal factors that impact the plan of care:      None.    Comorbidity factors that impact the plan of care are:      None.     Medications impacting care: None.  2) Examination of Body Systems comprised of:   Body structures and functions that impact the plan of care:      Hip and Shoulder.   Activity limitations that impact the plan of care are:      Lifting, Squatting/kneeling, Stairs and Standing.  3) Clinical presentation characteristics are:   Stable/Uncomplicated.  4) Decision-Making    Low complexity using standardized patient assessment instrument and/or measureable assessment of functional  outcome.  Cumulative Therapy Evaluation is: Low complexity.    Previous and current functional limitations:  (See Goal Flow Sheet for this information)    Short term and Long term goals: (See Goal Flow Sheet for this information)     Communication ability:  Patient appears to be able to clearly communicate and understand verbal and written communication and follow directions correctly.  Treatment Explanation - The following has been discussed with the patient:   RX ordered/plan of care  Anticipated outcomes  Possible risks and side effects  This patient would benefit from PT intervention to resume normal activities.   Rehab potential is good.    Frequency:  1 X week, once daily  Duration:  for 8 weeks  Discharge Plan:  Achieve all LTG.  Independent in home treatment program.  Reach maximal therapeutic benefit.    Please refer to the daily flowsheet for treatment today, total treatment time and time spent performing 1:1 timed codes.

## 2017-11-21 NOTE — LETTER
DEPARTMENT OF HEALTH AND HUMAN SERVICES  [facility]ENT[facility] FOR MEDICARE & MEDICAID SERVICES    PLAN/UPDATED PLAN OF PROGRESS FOR OUTPATIENT REHABILITATION    PATIENTS NAME:  Kreye, Susan     : 1950    PROVIDER NUMBER:    [id number]    [facility]    PROVIDER NAME: [facility]    MEDICAL RECORD NUMBER: [id number]     START OF CARE DATE:  SOC Date: 17   TYPE:  PT    PRIMARY/TREATMENT DIAGNOSIS: (Pertinent Medical Diagnosis)     Shoulder tendonitis, right  Unspecified injury of muscle, fascia and tendon of right hip, sequela    VISITS FROM START OF CARE:  Rxs Used: 1     Subjective:    Patient is a 67 year old female presenting with rehab right hip hpi and rehab right shoulder hpi.   Susan J Kreye is a 67 year old female with a right hip condition.  Condition occurred with:  Insidious onset.  Condition occurred: for unknown reasons.  This is a new condition  Onset: 10/21/17.  Thinks it might be due to going up and down stairs with 1 year old grand daughter.  Feels pulling along outside of right leg; also hurts at night ans steps; .    Radiates to:  Hip and gluteals.  Pain is described as aching and is intermittent and reported as 5/10.     Symptoms are exacerbated by ascending stairs, descending stairs, lying on extremity and bending/squatting and relieved by analgesics and rest.  Since onset symptoms are gradually worsening.  Special tests:  X-ray.                      Susan J Kreye is a 67 year old female with a right shoulder condition.        Onset: 2017.  Thinks it might be due to golfing but also picking up grand-daughter.  CC: aches; posterior shoulder.  Especially with raising arm; .    Patient reports pain:  Posterior.  Radiates to:  Shoulder.  Pain is described as aching and is intermittent and reported as 5/10.   Worse during: N/A.  Symptoms are exacerbated by lifting, using arm at shoulder level, certain positions, using arm behind  back, carrying and lying on extremity and relieved by analgesics (avoidance).  Since onset symptoms are unchanged.  Special tests:  X-ray.                      Pertinent medical history includes:  Osteoporosis, history of fractures and depression.  Medical allergies: yes (Penecillin ).  Other surgeries include:  Orthopedic surgery (Broken ankle ).  Current medications:  Anti-depressants and other (Glaucoma).  Current occupation is Real Estate             PATIENTS NAME:  Kreye, Susan     : 1950                          Objective:       Shoulder Evaluation:  ROM:  AROM:    Flexion:  Left:  170    Right:  130  Abduction:  Right:  125  Internal Rotation:  Right:  45  External Rotation:  Right:  110  Strength:    Flexion: Left:5-/5   Pain:    Right: 4+/5      Pain:  +  Abduction:  Left: 5-/5  Pain:    Right: 4+/5      Pain:+  Internal Rotation:  Left:5-/5     Pain:    Right: 5-/5     Pain:  External Rotation:   Left:5-/5     Pain:   Right:4+/5      Pain:+    Special Tests:  Special tests assessed shoulder: Possible RCT.  Palpation:  Palpation assessed shoulder: TTP coracoid process.  Right shoulder tenderness present at: Infraspinatus and Teres Minor  Hip Evaluation  HIP AROM:  : WNL- but pain at end range R hip ER.    Assessment/Plan:      Patient is a 67 year old female with right side shoulder and right side hip complaints.    Patient has the following significant findings with corresponding treatment plan.                Diagnosis 1:  R shoulder pain with S&Sx suggesting possible RCT  Decreased ROM/flexibility - manual therapy and therapeutic exercise  Decreased strength - therapeutic exercise and therapeutic activities  Impaired muscle performance - neuro re-education  Decreased function - therapeutic activities  Diagnosis 2:  R lateral hip pain, s&sx consistent with possible gluteal tendonopathy and bursitis and associated proximal weakness   Pain -  US, self management, education and home  program  Decreased strength - therapeutic exercise and therapeutic activities  Impaired muscle performance - neuro re-education  Decreased function - therapeutic activities                          PATIENTS NAME:  Kreye, Susan     : 1950                Therapy Evaluation Codes:   1) History comprised of:   Personal factors that impact the plan of care:      None.    Comorbidity factors that impact the plan of care are:      None.     Medications impacting care: None.  2) Examination of Body Systems comprised of:   Body structures and functions that impact the plan of care:      Hip and Shoulder.   Activity limitations that impact the plan of care are:      Lifting, Squatting/kneeling, Stairs and Standing.  3) Clinical presentation characteristics are:   Stable/Uncomplicated.  4) Decision-Making    Low complexity using standardized patient assessment instrument and/or measureable assessment of functional outcome.  Cumulative Therapy Evaluation is: Low complexity.  Previous and current functional limitations:  (See Goal Flow Sheet for this information)    Short term and Long term goals: (See Goal Flow Sheet for this information)   Communication ability:  Patient appears to be able to clearly communicate and understand verbal and written communication and follow directions correctly.  Treatment Explanation - The following has been discussed with the patient:   RX ordered/plan of care  Anticipated outcomes  Possible risks and side effects  This patient would benefit from PT intervention to resume normal activities.   Rehab potential is good.  Frequency:  1 X week, once daily  Duration:  for 8 weeks  Discharge Plan:  Achieve all LTG.  Independent in home treatment program.  Reach maximal therapeutic benefit.                        PATIENTS NAME:  Kreye, Susan     : 1950            Caregiver Signature/Credentials _____________________________ Date ________       Treating Provider: Korey Henry PT      I  "have reviewed and certified the need for these services and plan of treatment while under my care.        PHYSICIAN'S SIGNATURE:   _________________________________________  Date___________   Divya Hanks MD     Certification period:  Beginning of Cert date period: 11/21/17 to  End of Cert period date: 02/18/18     Functional Level Progress Report: Please see attached \"Goal Flow sheet for Functional level.\"    ____X____ Continue Services or       ________ DC Services                Service dates: From  SOC Date: 11/21/17 date to present                         "

## 2017-11-21 NOTE — MR AVS SNAPSHOT
After Visit Summary   11/21/2017    Susan J Kreye    MRN: [id number]           Patient Information     Date Of Birth          1950        Visit Information        Provider Department      11/21/2017 11:10 AM Korey Henry PT [facility] [place] [facility]        Today's Diagnoses     Shoulder tendonitis, right    -  1    Unspecified injury of muscle, fascia and tendon of right hip, sequela           Follow-ups after your visit        Your next 10 appointments already scheduled     Nov 28, 2017  9:30 AM CST   [facility] Extremity with Bimal Noyola PT   [facility] [place] [facility] ([facility] [place])    [address] [place] [address]  [address]  [place] [address]   [phone]            Dec 06, 2017 11:00 AM CST   [place] Extremity with Korey Henry PT   [facility] [place] [facility] ([facility] [place])    [address] [place] [address]  [address]  [place] [address]   [phone]            Dec 07, 2017 10:15 AM CST   Return Visit with Suellen Moss GC   Cancer Risk Management Program ([facility])    [facility]  [address]  [place] [address]   [phone]            Dec 12, 2017 10:30 AM CST   KAL[place]NA Extremity with Korey Henry PT   [facility] [place] [facility] ([facility] [place])    [address] [place] [address] [address]  [place] [address]   [phone]            Dec 20, 2017 11:00 AM CST   [place] Extremity with Korey Henry PT   [facility] [place] [facility] ([facility] [place])    [address] [place] [address] [address]  [place] [address] [phone]              Who to contact     If you have questions or need follow up information about today's clinic visit or your schedule please contact [facility] [place] [facility] directly at [phone].  Normal or non-critical lab  "and imaging results will be communicated to you by MyChart, letter or phone within 4 business days after the clinic has received the results. If you do not hear from us within 7 days, please contact the clinic through [employer] or phone. If you have a critical or abnormal lab result, we will notify you by phone as soon as possible.  Submit refill requests through [facility] or call your pharmacy and they will forward the refill request to us. Please allow 3 business days for your refill to be completed.          Additional Information About Your Visit        [employer] Information     [facility] lets you send messages to your doctor, view your test results, renew your prescriptions, schedule appointments and more. To sign up, go to [email or web address] . Click on \"Log in\" on the left side of the screen, which will take you to the Welcome page. Then click on \"Sign up Now\" on the right side of the page.     You will be asked to enter the access code listed below, as well as some personal information. Please follow the directions to create your username and password.     Your access code is: [email or web address]  Expires: 2018 11:20 AM     Your access code will  in 90 days. If you need help or a new code, please call your [facility] clinic or [phone].        Care EveryWhere ID     This is your Care EveryWhere ID. This could be used by other organizations to access your [facility] medical records  [id number]         Blood Pressure from Last 3 Encounters:   17 138/79   02/10/16 131/76   02/04/15 129/71    Weight from Last 3 Encounters:   17 70.7 kg (155 lb 14.4 oz)   02/10/16 68.9 kg (151 lb 12.8 oz)   02/04/15 68.7 kg (151 lb 6.4 oz)              We Performed the Following     HC PT EVAL, LOW COMPLEXITY     KALPANA INITIAL EVAL REPORT     THERAPEUTIC EXERCISES        Primary Care Provider    Physician No Ref-Primary       NO REF-PRIMARY PHYSICIAN        Equal Access to Services     CRISTA SAUER AH: Hadii aad ku " jason Rodríguez, ashada anabellayosef, ahsan kathao huerta, shabana emin hayaan toanspring innapaulina lalashawnmerari gloria. So [facility] [phone].    ATENCIÓN: Si habla mariangelañol, tiene a laguerre disposición servicios gratuitos de asistencia lingüística. Reynaldo al [phone].    We comply with applicable federal civil rights laws and [place] laws. We do not discriminate on the basis of race, color, national origin, age, disability, sex, sexual orientation, or gender identity.            Thank you!     Thank you for choosing [facility]  for your care. Our goal is always to provide you with excellent care. Hearing back from our patients is one way we can continue to improve our services. Please take a few minutes to complete the written survey that you may receive in the mail after your visit with us. Thank you!             Your Updated Medication List - Protect others around you: Learn how to safely use, store and throw away your medicines at www.disposemymeds.org.          This list is accurate as of: 11/21/17 12:37 PM.  Always use your most recent med list.                   Brand Name Dispense Instructions for use Diagnosis    brimonidine-timolol 0.2-0.5 % ophthalmic solution    COMBIGAN     1 drop 2 times daily.        CALCIUM + D PO      Take 1,500 mg by mouth daily.        MULTI-VITAMIN PO      Take 1 tablet by mouth daily.        sertraline 50 MG tablet    ZOLOFT     Take 75 mg by mouth daily.

## 2017-11-27 NOTE — PROGRESS NOTES
Subjective:    Patient is a 67 year old female presenting with rehab left ankle/foot hpi.                                      Pertinent medical history includes:  Osteoporosis, history of fractures and depression.  Medical allergies: yes (Penecillin ).  Other surgeries include:  Orthopedic surgery (Broken ankle ).  Current medications:  Anti-depressants and other (Glaucoma).  Current occupation is Real Estate       .                                    Objective:    System    Physical Exam    General     ROS    Assessment/Plan:

## 2017-11-28 ENCOUNTER — THERAPY VISIT (OUTPATIENT)
Dept: PHYSICAL THERAPY | Facility: CLINIC | Age: 67
End: 2017-11-28
Payer: MEDICARE

## 2017-11-28 DIAGNOSIS — S76.001S: ICD-10-CM

## 2017-11-28 DIAGNOSIS — M77.8 SHOULDER TENDONITIS, RIGHT: ICD-10-CM

## 2017-11-28 PROCEDURE — 97112 NEUROMUSCULAR REEDUCATION: CPT | Mod: GP | Performed by: PHYSICAL THERAPIST

## 2017-11-28 PROCEDURE — 97110 THERAPEUTIC EXERCISES: CPT | Mod: GP | Performed by: PHYSICAL THERAPIST

## 2017-12-07 ENCOUNTER — ONCOLOGY VISIT (OUTPATIENT)
Dept: ONCOLOGY | Facility: CLINIC | Age: 67
End: 2017-12-07
Attending: GENETIC COUNSELOR, MS
Payer: MEDICARE

## 2017-12-07 DIAGNOSIS — Z80.0 FAMILY HISTORY OF STOMACH CANCER: ICD-10-CM

## 2017-12-07 DIAGNOSIS — Z80.41 FAMILY HISTORY OF MALIGNANT NEOPLASM OF OVARY: Primary | ICD-10-CM

## 2017-12-07 DIAGNOSIS — Z80.8 FAMILY HISTORY OF THYROID CANCER: ICD-10-CM

## 2017-12-07 DIAGNOSIS — Z80.42 FAMILY HISTORY OF PROSTATE CANCER: ICD-10-CM

## 2017-12-07 PROCEDURE — 96040 ZZH GENETIC COUNSELING, EACH 30 MINUTES: CPT | Performed by: GENETIC COUNSELOR, MS

## 2017-12-07 NOTE — PROGRESS NOTES
"12/7/2017    Referring Provider: Sharri Real MD    Presenting Information:  Cheryl returned to the Cancer Risk Management Program at the [facility] to discuss her genetic testing results. Her blood was drawn on 10/26/2017.  OvaNext testing  was ordered from [facility]. This testing was done because of her family history of ovarian and prostate cancer.    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  No other mutations were found in the PMS2 gene.      Of note, Cheryl tested negative for mutations in the following genes by sequencing and deletion/duplication analysis: ASHLEY, BARD1, BRCA1, BRCA2, BRIP1, CDH1, CHEK2, DICER1, EPCAM, MLH1, MRE11A, MSH2, MSH6, MUTYH, NBN, NF1, PALB2, PMS2, PTEN, RAD50, RAD51C, RAD51D, SMARCA4, STK11, and TP53.  We reviewed the autosomal dominant inheritance of these genes.  Cheryl cannot pass on a mutation in any of these genes to her children based on this test result.  Mutations in these genes do not skip generations.      A copy of the test report can be found in the Laboratory tab, dated 10/26/2017, and named \"SEND OUTS MISC TEST\". The report is scanned in as a linked document.    Interpretation:  We discussed several different interpretations of this inconclusive test result.  It is not clear if this variant in the PMS2 gene is associated with increased cancer risk.  1. This variant may be a benign change that does not increase cancer risk.  2. This variant may be a harmful mutation that causes Taveras syndrome.    PMS2 mutations increase the lifetime risk of: colon cancer (15-20%), uterine cancer (15%), and other cancers.  There is a combined risk for stomach, ovarian hepatobiliary tract, urinary tract, small bowel, brain, and pancreatic cancer is 6% by age 70.     If individuals are found to have a mutation in the PMS2 gene, we would " recommend increased cancer screening at younger ages.  Risk reducing surgeries are also available options that can prevent the development of certain cancers.    The lab is working to determine if this variant is harmful or benign, and they will contact me if it is reclassified.  If this variant is determined to be a benign change, there may be a different gene or combination of genes and environment that are associated with the cancers in Cheryl's relatives.      It is also important to consider that her mother may have had a mutation in one of the genes tested and she did not inherit it.      Inheritance:  We reviewed the autosomal dominant inheritance of this variant in the PMS2 gene.  We discussed that Cheryl has a 50% chance to pass this variant to her children.   Likewise, each of her siblings also has a 50% risk of having the same variant.  Because it is unclear what, if any, risk is associated with this variant, clinical genetic testing is not recommended for relatives.    Family Studies:  The laboratory may offer additional research based testing for specific relatives in order to help reclassify this variant.    Screening:  Based on this inconclusive test result, it is important for Cheryl and her relatives to refer back to the family history for appropriate cancer screening.      Due to her mother's history of ovarian cancer, Cheryl and her close female relatives remain at slightly increased risk for ovarian cancer.  We discussed available ovarian cancer screening (pelvic exams, CA-125 blood tests, and transvaginal ultrasounds) as well as the significant limitations of this screening.  As such, this screening is not typically recommended.  That being said, women in Cheryl's family should discuss this screening and the signs and symptoms of ovarian cancer with their primary OB/GYN provider, as they may have individualized recommendations.     Other population cancer screening, such as recommendations by the  American Cancer Society, are also appropriate for Cheryl at this time.  These screening recommendations may change if there are changes to Cheryl's personal and/or family history.  Final screening recommendations should be made by each individual's managing physician.     Summary:  We do not have an explanation for her family history of cancer.  Because of that, it is important that she continue with cancer screening based on her personal and family history as discussed above.    Genetic testing is rapidly advancing, and new cancer susceptibility genes will most likely be identified in the future.  Therefore, I encouraged Cheryl to contact me annually or if there are changes in her personal or family history.  This may change how we assess her cancer risk, screening, and the testing we would offer.    Plan:  1.  I provided Cheryl with a copy of her test results today.    2. She plans to follow-up with her primary care provider.  3. She should contact me annually, or sooner if family history changes.  4. I will contact Cheryl if the lab informs me that this VUS has been reclassified.  This may change screening and testing recommendations for relatives.    If Cheryl has any further questions, I encouraged her to contact me at [phone].      Time spent face to face: 20 minutes.    Suellen Moss MS, [facility]  Certified Genetic Counselor  [phone]

## 2017-12-07 NOTE — LETTER
Cancer Risk Management  Program [facility]  Mailing Address  Cancer Risk Management Program  [address]  [place] [address]    New patient appointments  [phone]  December 12, 2017    Susan J Kreye  [id number]   [place] [address]      Dear Cheryl,    It was a pleasure meeting with you again at the [facility].  Here is a copy of the progress note from your recent genetic counseling visit to the Cancer Risk Management Program on 12/7/2017.  If you have any additional questions, please feel free to call.    Referring Provider: Sharri Real MD    Presenting Information:  Cheryl returned to the Cancer Risk Management Program at the [facility] to discuss her genetic testing results. Her blood was drawn on 10/26/2017.  OvaNext testing  was ordered from [facility]. This testing was done because of her family history of ovarian and prostate cancer.    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  No other mutations were found in the PMS2 gene.      Of note, Cheryl tested negative for mutations in the following genes by sequencing and deletion/duplication analysis: ASHLEY, BARD1, BRCA1, BRCA2, BRIP1, CDH1, CHEK2, DICER1, EPCAM, MLH1, MRE11A, MSH2, MSH6, MUTYH, NBN, NF1, PALB2, PMS2, PTEN, RAD50, RAD51C, RAD51D, SMARCA4, STK11, and TP53.  We reviewed the autosomal dominant inheritance of these genes.  Cheryl cannot pass on a mutation in any of these genes to her children based on this test result.  Mutations in these genes do not skip generations.      Interpretation:  We discussed several different interpretations of  this inconclusive test result.  It is not clear if this variant in the PMS2 gene is associated with increased cancer risk.  1. This variant may be a benign change that does not increase cancer risk.  2. This variant may be a harmful mutation that causes Taveras syndrome.    PMS2 mutations increase the lifetime risk of: colon cancer (15-20%), uterine cancer (15%), and other cancers.  There is a combined risk for stomach, ovarian hepatobiliary tract, urinary tract, small bowel, brain, and pancreatic cancer is 6% by age 70.     If individuals are found to have a mutation in the PMS2 gene, we would recommend increased cancer screening at younger ages.  Risk reducing surgeries are also available options that can prevent the development of certain cancers.    The lab is working to determine if this variant is harmful or benign, and they will contact me if it is reclassified.  If this variant is determined to be a benign change, there may be a different gene or combination of genes and environment that are associated with the cancers in Cheryl's relatives.      It is also important to consider that her mother may have had a mutation in one of the genes tested and she did not inherit it.      Inheritance:  We reviewed the autosomal dominant inheritance of this variant in the PMS2 gene.  We discussed that Cheryl has a 50% chance to pass this variant to her children.   Likewise, each of her siblings also has a 50% risk of having the same variant.  Because it is unclear what, if any, risk is associated with this variant, clinical genetic testing is not recommended for relatives.    Family Studies:  The laboratory may offer additional research based testing for specific relatives in order to help reclassify this variant.    Screening:  Based on this inconclusive test result, it is important for Cheryl and her relatives to refer back to the family history for appropriate cancer screening.      Due to her mother's history of ovarian  cancer, Cheryl and her close female relatives remain at slightly increased risk for ovarian cancer.  We discussed available ovarian cancer screening (pelvic exams, CA-125 blood tests, and transvaginal ultrasounds) as well as the significant limitations of this screening.  As such, this screening is not typically recommended.  That being said, women in Cheryl's family should discuss this screening and the signs and symptoms of ovarian cancer with their primary OB/GYN provider, as they may have individualized recommendations.     Other population cancer screening, such as recommendations by the American Cancer Society, are also appropriate for Cheryl at this time.  These screening recommendations may change if there are changes to Cheryl's personal and/or family history.  Final screening recommendations should be made by each individual's managing physician.     Summary:  We do not have an explanation for her family history of cancer.  Because of that, it is important that she continue with cancer screening based on her personal and family history as discussed above.    Genetic testing is rapidly advancing, and new cancer susceptibility genes will most likely be identified in the future.  Therefore, I encouraged Cheryl to contact me annually or if there are changes in her personal or family history.  This may change how we assess her cancer risk, screening, and the testing we would offer.    Plan:  1.  I provided Cheryl with a copy of her test results today.    2. She plans to follow-up with her primary care provider.  3. She should contact me annually, or sooner if family history changes.  4. I will contact Cheryl if the lab informs me that this VUS has been reclassified.  This may change screening and testing recommendations for relatives.    If Cheryl has any further questions, I encouraged her to contact me at [phone].    Suellen Moss MS, [facility]  Certified Genetic Counselor  [phone]

## 2017-12-07 NOTE — LETTER
"    12/7/2017         RE: Susan J Kreye  [id number]   [place] [address]        Dear Colleague,    Thank you for referring your patient, Susan J Kreye, to the CANCER RISK MANAGEMENT PROGRAM. Please see a copy of my visit note below.    12/7/2017    Referring Provider: Sharri Real MD    Presenting Information:  Cheryl returned to the Cancer Risk Management Program at the [facility] to discuss her genetic testing results. Her blood was drawn on 10/26/2017.  OvaNext testing  was ordered from [facility]. This testing was done because of her family history of ovarian and prostate cancer.    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  No other mutations were found in the PMS2 gene.      Of note, Cheryl tested negative for mutations in the following genes by sequencing and deletion/duplication analysis: ASHLEY, BARD1, BRCA1, BRCA2, BRIP1, CDH1, CHEK2, DICER1, EPCAM, MLH1, MRE11A, MSH2, MSH6, MUTYH, NBN, NF1, PALB2, PMS2, PTEN, RAD50, RAD51C, RAD51D, SMARCA4, STK11, and TP53.  We reviewed the autosomal dominant inheritance of these genes.  Cheryl cannot pass on a mutation in any of these genes to her children based on this test result.  Mutations in these genes do not skip generations.      A copy of the test report can be found in the Laboratory tab, dated 10/26/2017, and named \"[facility]\". The report is scanned in as a linked document.    Interpretation:  We discussed several different interpretations of this inconclusive test result.  It is not clear if this variant in the PMS2 gene is associated with increased cancer risk.  1. This variant may be a benign change that does not increase cancer risk.  2. This variant may be a harmful mutation that causes Taveras syndrome.    PMS2 mutations increase the lifetime risk of: colon cancer (15-20%), uterine " cancer (15%), and other cancers.  There is a combined risk for stomach, ovarian hepatobiliary tract, urinary tract, small bowel, brain, and pancreatic cancer is 6% by age 70.     If individuals are found to have a mutation in the PMS2 gene, we would recommend increased cancer screening at younger ages.  Risk reducing surgeries are also available options that can prevent the development of certain cancers.    The lab is working to determine if this variant is harmful or benign, and they will contact me if it is reclassified.  If this variant is determined to be a benign change, there may be a different gene or combination of genes and environment that are associated with the cancers in Cheryl's relatives.      It is also important to consider that her mother may have had a mutation in one of the genes tested and she did not inherit it.      Inheritance:  We reviewed the autosomal dominant inheritance of this variant in the PMS2 gene.  We discussed that Cheryl has a 50% chance to pass this variant to her children.   Likewise, each of her siblings also has a 50% risk of having the same variant.  Because it is unclear what, if any, risk is associated with this variant, clinical genetic testing is not recommended for relatives.    Family Studies:  The laboratory may offer additional research based testing for specific relatives in order to help reclassify this variant.    Screening:  Based on this inconclusive test result, it is important for Cheryl and her relatives to refer back to the family history for appropriate cancer screening.      Due to her mother's history of ovarian cancer, Cheryl and her close female relatives remain at slightly increased risk for ovarian cancer.  We discussed available ovarian cancer screening (pelvic exams, CA-125 blood tests, and transvaginal ultrasounds) as well as the significant limitations of this screening.  As such, this screening is not typically recommended.  That being said, women  in Cheryl's family should discuss this screening and the signs and symptoms of ovarian cancer with their primary OB/GYN provider, as they may have individualized recommendations.     Other population cancer screening, such as recommendations by the American Cancer Society, are also appropriate for Cheryl at this time.  These screening recommendations may change if there are changes to Cheryl's personal and/or family history.  Final screening recommendations should be made by each individual's managing physician.     Summary:  We do not have an explanation for her family history of cancer.  Because of that, it is important that she continue with cancer screening based on her personal and family history as discussed above.    Genetic testing is rapidly advancing, and new cancer susceptibility genes will most likely be identified in the future.  Therefore, I encouraged Cheryl to contact me annually or if there are changes in her personal or family history.  This may change how we assess her cancer risk, screening, and the testing we would offer.    Plan:  1.  I provided Cheryl with a copy of her test results today.    2. She plans to follow-up with her primary care provider.  3. She should contact me annually, or sooner if family history changes.  4. I will contact Cheryl if the lab informs me that this VUS has been reclassified.  This may change screening and testing recommendations for relatives.    If Cheryl has any further questions, I encouraged her to contact me at [phone].      Time spent face to face: 20 minutes.    Suellen Moss MS, [facility]  Certified Genetic Counselor  [phone]                Again, thank you for allowing me to participate in the care of your patient.        Sincerely,        Suellen Msos GC

## 2017-12-07 NOTE — PATIENT INSTRUCTIONS
Genetic Testing  You had a blood test that looked at the genetic information in one or more genes associated with increased cancer risk.  The testing looked for any harmful changes that would stop this particular gene from working like it should.  It is important to remember that everyone has variants in multiple genes in their bodies that do not affect how the gene works.  These changes are benign and do not cause disease or increase cancer risk.  The term often used to describe these variants is benign polymorphism.  If an individual is found to have a benign polymorphism, their genetic test result is reported as Negative.    Results  There are three possible results of any genetic test:    Positive--a harmful mutation was identified    Negative--no mutation was identified    Variant of unknown significance--a variation in one of the genes was identified, but it is unclear how this impacts cancer risk in the family    Variant of Unknown Significance (VUS)  A VUS was identified in your blood sample.  Scientists currently do not know if this variant is harmful or if it is a benign polymorphism not associated with any increased risk of cancer.   There are several reasons for this lack of knowledge, but likely your VUS has either never been seen before or has only been seen in a small number of individuals.  Until your VUS is studied in more detail and/or seen in more families, scientists are not able to predict if it is a harmful mutation or a benign polymorphism.  Therefore, the cause of the cancer in you and your relatives is still unknown.          Reclassification  Genetic testing laboratories and researchers are constantly working to determine the importance of variants of unknown significance.  Most laboratories will notify the genetic counselor when a patient s VUS has been reclassified as a harmful mutation or a benign polymorphism.      Some families may be candidates for participation in the laboratory s  variant research programs to help determine the importance of their VUS.  Only the testing laboratory can decide who is eligible for participation.  Participating in these programs does not guarantee that families will learn the significance of their VUS immediately.  It could be months or years before a VUS is reclassified.       Screening Recommendations  Cancer screening recommendations for patients with a VUS should be based on their personal and family history rather than the VUS itself.  This may include increased cancer screening for certain individuals in the family.  Your genetic counselor and health care provider can help make appropriate recommendations for you and your relatives.      It is usually not recommended that other relatives have genetic testing for the VUS unless it is done as part of the laboratory s variant research program because an individual s test results should not influence their cancer screening until we determine the importance of the VUS.  Your genetic counselor can help you and your relatives understand the risks and benefits of all genetic testing and cancer screening options.    Please call us if you have any questions or concerns.     Cancer Risk Management Program [phone]-[facility]ER ([phone])  ? Mitra Loaiza, MS, [facility]  [phone]  ? Nan Ervin, MS, [facility]  [phone]  ? Beckie Masters, MS, [facility]  [phone]  ? Suellen Moss, MS, [facility]  [phone]   ? Medina Hutchison, MS, [facility]  [phone]

## 2017-12-07 NOTE — MR AVS SNAPSHOT
After Visit Summary   12/7/2017    Susan J Kreye    MRN: [id number]           Patient Information     Date Of Birth          1950        Visit Information        Provider Department      12/7/2017 10:15 AM Suellen Moss GC Cancer Risk Management Program        Today's Diagnoses     Family history of malignant neoplasm of ovary    -  1    Family history of prostate cancer        Family history of thyroid cancer        Family history of stomach cancer          Care Instructions      Genetic Testing  You had a blood test that looked at the genetic information in one or more genes associated with increased cancer risk.  The testing looked for any harmful changes that would stop this particular gene from working like it should.  It is important to remember that everyone has variants in multiple genes in their bodies that do not affect how the gene works.  These changes are benign and do not cause disease or increase cancer risk.  The term often used to describe these variants is benign polymorphism.  If an individual is found to have a benign polymorphism, their genetic test result is reported as Negative.    Results  There are three possible results of any genetic test:    Positive--a harmful mutation was identified    Negative--no mutation was identified    Variant of unknown significance--a variation in one of the genes was identified, but it is unclear how this impacts cancer risk in the family    Variant of Unknown Significance (VUS)  A VUS was identified in your blood sample.  Scientists currently do not know if this variant is harmful or if it is a benign polymorphism not associated with any increased risk of cancer.   There are several reasons for this lack of knowledge, but likely your VUS has either never been seen before or has only been seen in a small number of individuals.  Until your VUS is studied in more detail and/or seen in more families, scientists are not able to predict if it is a  harmful mutation or a benign polymorphism.  Therefore, the cause of the cancer in you and your relatives is still unknown.          Reclassification  Genetic testing laboratories and researchers are constantly working to determine the importance of variants of unknown significance.  Most laboratories will notify the genetic counselor when a patient s VUS has been reclassified as a harmful mutation or a benign polymorphism.      Some families may be candidates for participation in the laboratory s variant research programs to help determine the importance of their VUS.  Only the testing laboratory can decide who is eligible for participation.  Participating in these programs does not guarantee that families will learn the significance of their VUS immediately.  It could be months or years before a VUS is reclassified.       Screening Recommendations  Cancer screening recommendations for patients with a VUS should be based on their personal and family history rather than the VUS itself.  This may include increased cancer screening for certain individuals in the family.  Your genetic counselor and health care provider can help make appropriate recommendations for you and your relatives.      It is usually not recommended that other relatives have genetic testing for the VUS unless it is done as part of the laboratory s variant research program because an individual s test results should not influence their cancer screening until we determine the importance of the VUS.  Your genetic counselor can help you and your relatives understand the risks and benefits of all genetic testing and cancer screening options.    Please call us if you have any questions or concerns.     Cancer Risk Management Program [phone]-[facility]-CANCER ([phone])  ? Mitra Loaiza, MS, [facility]  [phone]  ? Nan Ervin, MS, [facility]  [phone]  ? Beckie Masters, MS, [facility]  [phone]  ? Suellen Moss, MS, [facility]  [phone]   ? Medina Hutchison MS,  "[facility]  [phone]            Follow-ups after your visit        Your next 10 appointments already scheduled     Dec 08, 2017 10:10 AM CST   KALPANA Extremity with Bimal N[facility], [facility] [place] [facility] ([facility] [place])    [address] [place] [address]  [address]  [place] [address]   [phone]            Dec 12, 2017 10:30 AM CST   KALPANA Extremity with Korey Henry, [facility] [place] [facility] ([facility] [place])    [address] [place] [address]  [address]  [place] [address]   [phone]            Dec 20, 2017 11:00 AM CST   KALPANA Extremity with Korey Henry, [facility] [place] [facility] ([facility] [place])    [address] [place] [address]  [address]  [place] [address]   [phone]              Who to contact     If you have questions or need follow up information about today's clinic visit or your schedule please contact CANCER RISK MANAGEMENT PROGRAM directly at [phone].  Normal or non-critical lab and imaging results will be communicated to you by Whimseyboxhart, letter or phone within 4 business days after the clinic has received the results. If you do not hear from us within 7 days, please contact the clinic through Whimseyboxhart or phone. If you have a critical or abnormal lab result, we will notify you by phone as soon as possible.  Submit refill requests through Groove or call your pharmacy and they will forward the refill request to us. Please allow 3 business days for your refill to be completed.          Additional Information About Your Visit        Groove Information     Groove lets you send messages to your doctor, view your test results, renew your prescriptions, schedule appointments and more. To sign up, go to [email or web address] . Click on \"Log in\" on the left side of the screen, which will take you to the Welcome page. Then click on \"Sign up Now\" on the right side of the page.     You will " be asked to enter the access code listed below, as well as some personal information. Please follow the directions to create your username and password.     Your access code is: [email or web address]  Expires: 2018 11:20 AM     Your access code will  in 90 days. If you need help or a new code, please call your [place] clinic or [phone].        Care EveryWhere ID     This is your Care EveryWhere ID. This could be used by other organizations to access your [place] medical records  [id number]         Blood Pressure from Last 3 Encounters:   17 138/79   02/10/16 131/76   02/04/15 129/71    Weight from Last 3 Encounters:   17 70.7 kg (155 lb 14.4 oz)   02/10/16 68.9 kg (151 lb 12.8 oz)   02/04/15 68.7 kg (151 lb 6.4 oz)              Today, you had the following     No orders found for display       Primary Care Provider Fax #    Physician No Ref-Primary [phone]       No address on file        Equal Access to Services     [facility]: Hadii wojciech Rodríguez, waaxda luanamadaha, qaybta kaalmasyeda huerta, shabana méndez . So [facility] [phone].    ATENCIÓN: Si habla español, tiene a laguerre disposición servicios gratuitos de asistencia lingüística. Llame al [phone].    We comply with applicable federal civil rights laws and [place] laws. We do not discriminate on the basis of race, color, national origin, age, disability, sex, sexual orientation, or gender identity.            Thank you!     Thank you for choosing CANCER RISK MANAGEMENT PROGRAM  for your care. Our goal is always to provide you with excellent care. Hearing back from our patients is one way we can continue to improve our services. Please take a few minutes to complete the written survey that you may receive in the mail after your visit with us. Thank you!             Your Updated Medication List - Protect others around you: Learn how to safely use, store and throw away your medicines at  www.disposemymeds.org.          This list is accurate as of: 12/7/17 10:23 AM.  Always use your most recent med list.                   Brand Name Dispense Instructions for use Diagnosis    brimonidine-timolol 0.2-0.5 % ophthalmic solution    COMBIGAN     1 drop 2 times daily.        CALCIUM + D PO      Take 1,500 mg by mouth daily.        MULTI-VITAMIN PO      Take 1 tablet by mouth daily.        sertraline 50 MG tablet    ZOLOFT     Take 75 mg by mouth daily.

## 2017-12-12 ENCOUNTER — THERAPY VISIT (OUTPATIENT)
Dept: PHYSICAL THERAPY | Facility: CLINIC | Age: 67
End: 2017-12-12
Payer: MEDICARE

## 2017-12-12 DIAGNOSIS — S76.001S: ICD-10-CM

## 2017-12-12 DIAGNOSIS — M77.8 SHOULDER TENDONITIS, RIGHT: ICD-10-CM

## 2017-12-12 PROCEDURE — 97110 THERAPEUTIC EXERCISES: CPT | Mod: GP

## 2017-12-12 PROCEDURE — 97112 NEUROMUSCULAR REEDUCATION: CPT | Mod: GP

## 2017-12-20 ENCOUNTER — THERAPY VISIT (OUTPATIENT)
Dept: PHYSICAL THERAPY | Facility: CLINIC | Age: 67
End: 2017-12-20
Payer: MEDICARE

## 2017-12-20 DIAGNOSIS — M77.8 SHOULDER TENDONITIS, RIGHT: ICD-10-CM

## 2017-12-20 DIAGNOSIS — S76.001S: ICD-10-CM

## 2017-12-20 PROCEDURE — 97112 NEUROMUSCULAR REEDUCATION: CPT | Mod: GP

## 2017-12-20 PROCEDURE — 97110 THERAPEUTIC EXERCISES: CPT | Mod: GP

## 2018-01-02 ENCOUNTER — THERAPY VISIT (OUTPATIENT)
Dept: PHYSICAL THERAPY | Facility: CLINIC | Age: 68
End: 2018-01-02
Payer: MEDICARE

## 2018-01-02 DIAGNOSIS — M77.8 SHOULDER TENDONITIS, RIGHT: ICD-10-CM

## 2018-01-02 DIAGNOSIS — S76.001S: ICD-10-CM

## 2018-01-02 PROCEDURE — 97112 NEUROMUSCULAR REEDUCATION: CPT | Mod: GP | Performed by: PHYSICAL THERAPIST

## 2018-01-02 PROCEDURE — 97110 THERAPEUTIC EXERCISES: CPT | Mod: GP | Performed by: PHYSICAL THERAPIST

## 2018-04-18 ENCOUNTER — OFFICE VISIT (OUTPATIENT)
Dept: OBGYN | Facility: CLINIC | Age: 68
End: 2018-04-18
Attending: OBSTETRICS & GYNECOLOGY
Payer: MEDICARE

## 2018-04-18 VITALS
HEIGHT: 67 IN | DIASTOLIC BLOOD PRESSURE: 75 MMHG | HEART RATE: 68 BPM | WEIGHT: 150.6 LBS | SYSTOLIC BLOOD PRESSURE: 126 MMHG | BODY MASS INDEX: 23.64 KG/M2

## 2018-04-18 DIAGNOSIS — Z00.00 VISIT FOR PREVENTIVE HEALTH EXAMINATION: ICD-10-CM

## 2018-04-18 DIAGNOSIS — Z80.41 FAMILY HISTORY OF MALIGNANT NEOPLASM OF OVARY: Primary | ICD-10-CM

## 2018-04-18 PROBLEM — E78.5 HYPERLIPIDEMIA: Status: ACTIVE | Noted: 2018-04-18

## 2018-04-18 PROBLEM — R94.31 ABNORMAL EKG: Status: ACTIVE | Noted: 2018-04-18

## 2018-04-18 NOTE — MR AVS SNAPSHOT
"              After Visit Summary   2018    Susan J Kreye    MRN: [id number]           Patient Information     Date Of Birth          1950        Visit Information        Provider Department      2018 10:00 AM Sharri Real MD [facility]        Today's Diagnoses     Family history of malignant neoplasm of ovary    -  1    Visit for preventive health examination           Follow-ups after your visit        Follow-up notes from your care team     Return in 1 year (on 2019) for Preventative Health Visit.      Who to contact     Please call your clinic at [phone] to:    Ask questions about your health    Make or cancel appointments    Discuss your medicines    Learn about your test results    Speak to your doctor            Additional Information About Your Visit        MyChart Information     DataFoxhart is an electronic gateway that provides easy, online access to your medical records. With Netsket, you can request a clinic appointment, read your test results, renew a prescription or communicate with your care team.     To sign up for Clinithinkt visit the website at [email or web address]   You will be asked to enter the access code listed below, as well as some personal information. Please follow the directions to create your username and password.     Your access code is: [email or web address]  Expires: 7/3/2018  6:31 AM     Your access code will  in 90 days. If you need help or a new code, please contact your [facility] or call [phone] for assistance.        Care EveryWhere ID     This is your Care EveryWhere ID. This could be used by other organizations to access your [place] medical records  [id number]        Your Vitals Were     Pulse Height BMI (Body Mass Index)             68 1.702 m (5' 7\") 23.59 kg/m2          Blood Pressure from Last 3 Encounters:   18 126/75   17 138/79   02/10/16 131/76    Weight from Last " 3 Encounters:   04/18/18 68.3 kg (150 lb 9.6 oz)   04/12/17 70.7 kg (155 lb 14.4 oz)   02/10/16 68.9 kg (151 lb 12.8 oz)              Today, you had the following     No orders found for display       Primary Care Provider Fax #    Physician No Ref-Primary [phone]       No address on file        Equal Access to Services     [facility] : Hadii aad ku hadasho Soomaali, waaxda luqadaha, qaybta kaalmada adeegyada, waxmariel emin paun toanspring persaud laIvettandrew . So [facility] [phone].    ATENCIÓN: Si habla español, tiene a laguerre disposición servicios gratuitos de asistencia lingüística. Llame al [phone].    We comply with applicable federal civil rights laws and [place] laws. We do not discriminate on the basis of race, color, national origin, age, disability, sex, sexual orientation, or gender identity.            Thank you!     Thank you for choosing [facility]  for your care. Our goal is always to provide you with excellent care. Hearing back from our patients is one way we can continue to improve our services. Please take a few minutes to complete the written survey that you may receive in the mail after your visit with us. Thank you!             Your Updated Medication List - Protect others around you: Learn how to safely use, store and throw away your medicines at www.disposemymeds.org.          This list is accurate as of 4/18/18 11:59 PM.  Always use your most recent med list.                   Brand Name Dispense Instructions for use Diagnosis    brimonidine-timolol 0.2-0.5 % ophthalmic solution    COMBIGAN     1 drop 2 times daily.        CALCIUM + D PO      Take 1,500 mg by mouth daily.        MULTI-VITAMIN PO      Take 1 tablet by mouth daily.        sertraline 50 MG tablet    ZOLOFT     Take 75 mg by mouth daily.

## 2018-04-18 NOTE — LETTER
Date:April 20, 2018      Patient was self referred, no letter generated. Do not send.        [facility] Information

## 2018-04-18 NOTE — LETTER
2018       RE: Susan J Kreye  [id number] [facility] [address]     Dear Colleague,    Thank you for referring your patient, Susan J Kreye, to the [facility] at [facility]. Please see a copy of my visit note below.          Progress Note    SUBJECTIVE:  Susan J Kreye is an 68 year old, , who requests an Annual Preventive Exam.       Concerns today include:   Patient Active Problem List   Diagnosis     Closed left ankle fracture     Family history of malignant neoplasm of ovary     Unspecified injury of muscle, fascia and tendon of right hip, sequela     Shoulder tendonitis, right     Abnormal EKG     Depression     Follow-up examination following surgery     Glaucoma     Hearing loss in right ear     Osteopenia     Hyperlipidemia         Menstrual History:  PM    Last    Lab Results   Component Value Date    PAP NIL 01/10/2014     History of abnormal Pap smear: NO - age 63 - see link Cervical Cytology Screening Guidelines    Last No results found for: HPV16  Last No results found for: HPV18  Last No results found for: HRHPV    Mammogram current: yes  Last Mammogram:   No results found.     Last Colonoscopy:  No results found for this or any previous visit.      HISTORY:    Current Outpatient Prescriptions on File Prior to Visit:  brimonidine-timolol (COMBIGAN) 0.2-0.5 % ophthalmic solution 1 drop 2 times daily.   Calcium Carbonate-Vitamin D (CALCIUM + D PO) Take 1,500 mg by mouth daily.   Multiple Vitamin (MULTI-VITAMIN PO) Take 1 tablet by mouth daily.   sertraline (ZOLOFT) 50 MG tablet Take 75 mg by mouth daily.     No current facility-administered medications on file prior to visit.   Allergies   Allergen Reactions     Penicillins      Vaginal infections     Immunization History   Administered Date(s) Administered     Influenza (IIV3) PF 2013     TDAP Vaccine (Adacel) 2014       Obstetric History       T1   P0    A0   L2     SAB0   TAB0   Ectopic0   Multiple0   Live Births1      Past Medical History:   Diagnosis Date     Depression      Glaucoma      Migraines      Osteoporosis      Osteoporosis      Past Surgical History:   Procedure Laterality Date     CYSTOCELE REPAIR  1980     EYE SURGERY  september october 2015     OPEN REDUCTION INTERNAL FIXATION ANKLE  1/31/2013    Procedure: OPEN REDUCTION INTERNAL FIXATION ANKLE;  OPEN REDUCTION INTERNAL FIXATION LEFT ANKLE TRIMALLEOLAR FRACTURE ;  Surgeon: Rishabh Lantigua MD;  Location: [facility] OR     Family History   Problem Relation Age of Onset     CANCER Mother 83     Ovarian     DIABETES Mother      DM II, controlled with diet     Prostate Cancer Mother      GASTROINTESTINAL DISEASE Sister      Gallbladder     CANCER Father 60     Lung     Prostate Cancer Father      CANCER Maternal Grandmother      Bladder     Prostate Cancer Maternal Grandmother      C.A.D. Paternal Grandmother      CEREBROVASCULAR DISEASE Paternal Grandfather 88     Prostate Cancer Maternal Grandfather      Social History     Social History     Marital status:      Spouse name: N/A     Number of children: N/A     Years of education: N/A     Occupational History           Social History Main Topics     Smoking status: Never Smoker     Smokeless tobacco: Never Used     Alcohol use 0.0 - 0.5 oz/week     Drug use: No     Sexual activity: No     Other Topics Concern     Blood Transfusions No     Caffeine Concern No     None     Occupational Exposure No     Hobby Hazards No     Sleep Concern No     Stress Concern No     , doing well now     Weight Concern No     Special Diet No     Back Care No     Exercise No     Walks 4x/week     Seat Belt No     Social History Narrative    1/10/14     ( committed suicide in 2010)        How much exercise per week? 4 days    How much calcium per day? supplement       How much caffeine per day? none    How much vitamin D per day? supplement  "   Do you/your family wear seatbelts?  Yes    Do you/your family use safety helmets? No    Do you/your family use sunscreen? Yes    Do you/your family keep firearms in the home? No    Do you/your family have a smoke detector(s)? Yes        Do you feel safe in your home? Yes    Has anyone ever touched you in an unwanted manner? No     Explain Nan Brown, [facility] 02/04/2015               ROS  [unfilled]  PHQ-9 SCORE 2/4/2015 2/10/2016   Total Score 1 -   Total Score MyChart - 0     FELICITY-7 SCORE 2/10/2016   Total Score 0 (minimal anxiety)         EXAM:  Blood pressure 126/75, pulse 68, height 1.702 m (5' 7\"), weight 68.3 kg (150 lb 9.6 oz). Body mass index is 23.59 kg/(m^2).  General - pleasant female in no acute distress.  Skin - no suspicious lesions or rashes  EENT-  PERRLA, euthyroid with out palpable nodules  Neck - supple without lymphadenopathy.  Lungs - clear to auscultation bilaterally.  Heart - regular rate and rhythm without murmur.  Abdomen - soft, nontender, nondistended, no masses or organomegaly noted.  Musculoskeletal - no gross deformities.  Neurological - normal strength, sensation, and mental status.    Breast Exam:  Breast: Without visible skin changes. No dimpling or lesions seen.   Breasts supple, non-tender with palpation, no dominant mass, nodularity, or nipple discharge noted bilaterally. Axillary nodes negative.      Pelvic Exam:  EG/BUS: Normal genital architecture without lesions, erythema or abnormal secretions Bartholin's, Urethra, [place]'s normal   Urethral meatus: normal   Urethra: no masses, tenderness, or scarring   Bladder: no masses or tenderness   Vagina: atrophic, thin, dry with creamy, white and odorless  secretions  Cervix: Nulliparous,, no lesions and pale, dry, stenotic  Uterus: anteverted,  and small, smooth, firm, mobile w/o pain  Adnexa: Within normal limits and No masses, nodularity, tenderness  Rectum:anus normal       ASSESSMENT:  Encounter Diagnoses   Name Primary?     Family " history of malignant neoplasm of ovary Yes     Visit for preventive health examination    patient had negative genetic testing so annual CA 125s are no longer recommended.      PLAN:     RTC one year.               Again, thank you for allowing me to participate in the care of your patient.      Sincerely,    Sharri Real MD

## 2018-04-19 NOTE — PROGRESS NOTES
Progress Note    SUBJECTIVE:  Susan J Kreye is an 68 year old, , who requests an Annual Preventive Exam.       Concerns today include:   Patient Active Problem List   Diagnosis     Closed left ankle fracture     Family history of malignant neoplasm of ovary     Unspecified injury of muscle, fascia and tendon of right hip, sequela     Shoulder tendonitis, right     Abnormal EKG     Depression     Follow-up examination following surgery     Glaucoma     Hearing loss in right ear     Osteopenia     Hyperlipidemia         Menstrual History:  PM    Last    Lab Results   Component Value Date    PAP NIL 01/10/2014     History of abnormal Pap smear: NO - age 63 - see link Cervical Cytology Screening Guidelines    Last No results found for: HPV16  Last No results found for: HPV18  Last No results found for: HRHPV    Mammogram current: yes  Last Mammogram:   No results found.     Last Colonoscopy:  No results found for this or any previous visit.      HISTORY:    Current Outpatient Prescriptions on File Prior to Visit:  brimonidine-timolol (COMBIGAN) 0.2-0.5 % ophthalmic solution 1 drop 2 times daily.   Calcium Carbonate-Vitamin D (CALCIUM + D PO) Take 1,500 mg by mouth daily.   Multiple Vitamin (MULTI-VITAMIN PO) Take 1 tablet by mouth daily.   sertraline (ZOLOFT) 50 MG tablet Take 75 mg by mouth daily.     No current facility-administered medications on file prior to visit.   Allergies   Allergen Reactions     Penicillins      Vaginal infections     Immunization History   Administered Date(s) Administered     Influenza (IIV3) PF 2013     TDAP Vaccine (Adacel) 2014       Obstetric History       T1      L2     SAB0   TAB0   Ectopic0   Multiple0   Live Births1      Past Medical History:   Diagnosis Date     Depression      Glaucoma      Migraines      Osteoporosis      Osteoporosis      Past Surgical History:   Procedure Laterality Date     CYSTOCELE REPAIR       EYE SURGERY   september october 2015     OPEN REDUCTION INTERNAL FIXATION ANKLE  1/31/2013    Procedure: OPEN REDUCTION INTERNAL FIXATION ANKLE;  OPEN REDUCTION INTERNAL FIXATION LEFT ANKLE TRIMALLEOLAR FRACTURE ;  Surgeon: Rishabh Lantigua MD;  Location: [facility] OR     Family History   Problem Relation Age of Onset     CANCER Mother 83     Ovarian     DIABETES Mother      DM II, controlled with diet     Prostate Cancer Mother      GASTROINTESTINAL DISEASE Sister      Gallbladder     CANCER Father 60     Lung     Prostate Cancer Father      CANCER Maternal Grandmother      Bladder     Prostate Cancer Maternal Grandmother      C.A.D. Paternal Grandmother      CEREBROVASCULAR DISEASE Paternal Grandfather 88     Prostate Cancer Maternal Grandfather      Social History     Social History     Marital status:      Spouse name: N/A     Number of children: N/A     Years of education: N/A     Occupational History           Social History Main Topics     Smoking status: Never Smoker     Smokeless tobacco: Never Used     Alcohol use 0.0 - 0.5 oz/week     Drug use: No     Sexual activity: No     Other Topics Concern     Blood Transfusions No     Caffeine Concern No     None     Occupational Exposure No     Hobby Hazards No     Sleep Concern No     Stress Concern No     , doing well now     Weight Concern No     Special Diet No     Back Care No     Exercise No     Walks 4x/week     Seat Belt No     Social History Narrative    1/10/14     ( committed suicide in 2010)        How much exercise per week? 4 days    How much calcium per day? supplement       How much caffeine per day? none    How much vitamin D per day? supplement    Do you/your family wear seatbelts?  Yes    Do you/your family use safety helmets? No    Do you/your family use sunscreen? Yes    Do you/your family keep firearms in the home? No    Do you/your family have a smoke detector(s)? Yes        Do you feel safe in your home? Yes    Has  "anyone ever touched you in an unwanted manner? No     Explain Nan Brown, [facility] 02/04/2015               ROS  [unfilled]  PHQ-9 SCORE 2/4/2015 2/10/2016   Total Score 1 -   Total Score MyChart - 0     FELICITY-7 SCORE 2/10/2016   Total Score 0 (minimal anxiety)         EXAM:  Blood pressure 126/75, pulse 68, height 1.702 m (5' 7\"), weight 68.3 kg (150 lb 9.6 oz). Body mass index is 23.59 kg/(m^2).  General - pleasant female in no acute distress.  Skin - no suspicious lesions or rashes  EENT-  PERRLA, euthyroid with out palpable nodules  Neck - supple without lymphadenopathy.  Lungs - clear to auscultation bilaterally.  Heart - regular rate and rhythm without murmur.  Abdomen - soft, nontender, nondistended, no masses or organomegaly noted.  Musculoskeletal - no gross deformities.  Neurological - normal strength, sensation, and mental status.    Breast Exam:  Breast: Without visible skin changes. No dimpling or lesions seen.   Breasts supple, non-tender with palpation, no dominant mass, nodularity, or nipple discharge noted bilaterally. Axillary nodes negative.      Pelvic Exam:  EG/BUS: Normal genital architecture without lesions, erythema or abnormal secretions Bartholin's, Urethra, [place]'s normal   Urethral meatus: normal   Urethra: no masses, tenderness, or scarring   Bladder: no masses or tenderness   Vagina: atrophic, thin, dry with creamy, white and odorless  secretions  Cervix: Nulliparous,, no lesions and pale, dry, stenotic  Uterus: anteverted,  and small, smooth, firm, mobile w/o pain  Adnexa: Within normal limits and No masses, nodularity, tenderness  Rectum:anus normal       ASSESSMENT:  Encounter Diagnoses   Name Primary?     Family history of malignant neoplasm of ovary Yes     Visit for preventive health examination    patient had negative genetic testing so annual CA 125s are no longer recommended.      PLAN:     RTC one year.             "

## 2019-04-04 ENCOUNTER — TRANSFERRED RECORDS (OUTPATIENT)
Dept: HEALTH INFORMATION MANAGEMENT | Facility: CLINIC | Age: 69
End: 2019-04-04

## 2019-06-05 ENCOUNTER — OFFICE VISIT (OUTPATIENT)
Dept: OBGYN | Facility: CLINIC | Age: 69
End: 2019-06-05
Attending: OBSTETRICS & GYNECOLOGY
Payer: MEDICARE

## 2019-06-05 VITALS — WEIGHT: 149.9 LBS | BODY MASS INDEX: 23.53 KG/M2 | HEIGHT: 67 IN

## 2019-06-05 DIAGNOSIS — Z00.00 VISIT FOR PREVENTIVE HEALTH EXAMINATION: Primary | ICD-10-CM

## 2019-06-05 RX ORDER — FINASTERIDE 1 MG/1
1 TABLET, FILM COATED ORAL
COMMUNITY
Start: 2019-01-11

## 2019-06-05 ASSESSMENT — MIFFLIN-ST. JEOR: SCORE: 1227.57

## 2019-06-05 NOTE — LETTER
Date:June 7, 2019      Patient was self referred, no letter generated. Do not send.        [facility] Health Information

## 2019-06-05 NOTE — LETTER
2019       RE: Susan J Kreye  [address] [facility] [address]     Dear Colleague,    Thank you for referring your patient, Susan J Kreye, to the [facility] at [facility]. Please see a copy of my visit note below.      Progress Note    SUBJECTIVE:  Susan J Kreye is an 69 year old, , who requests an Annual Preventive Exam.     Concerns today include: doing well but is currently worried about UTI (is seeing her primary care for this later today and declines UA here) as well as several days history of abdominal bloating.     Menstrual History:  postmenopausal    Last    Lab Results   Component Value Date    PAP NIL 01/10/2014     History of abnormal Pap smear: NO - age 63 - see link Cervical Cytology Screening Guidelines    Last No results found for: HPV16  Last No results found for: HPV18  Last No results found for: HRHPV    Mammogram current: yes  Last Mammogram:   No results found.     Last Colonoscopy:  No results found for this or any previous visit.      HISTORY:    Current Outpatient Medications on File Prior to Visit:  brimonidine-timolol (COMBIGAN) 0.2-0.5 % ophthalmic solution 1 drop 2 times daily.   Calcium Carbonate-Vitamin D (CALCIUM + D PO) Take 1,500 mg by mouth daily.   finasteride (PROPECIA) 1 MG tablet Take 1 mg by mouth   Multiple Vitamin (MULTI-VITAMIN PO) Take 1 tablet by mouth daily.   sertraline (ZOLOFT) 50 MG tablet Take 75 mg by mouth daily.     No current facility-administered medications on file prior to visit.   Allergies   Allergen Reactions     Penicillins      Vaginal infections     Immunization History   Administered Date(s) Administered     Influenza (IIV3) PF 2013     TDAP Vaccine (Adacel) 2014       OB History    Para Term  AB Living   1 1 1 0 0 2   SAB TAB Ectopic Multiple Live Births   0 0 0 0 1     Past Medical History:   Diagnosis Date     Depression      Glaucoma       Migraines      Osteoporosis      Osteoporosis      Past Surgical History:   Procedure Laterality Date     CYSTOCELE REPAIR  1980     EYE SURGERY  september october 2015     OPEN REDUCTION INTERNAL FIXATION ANKLE  1/31/2013    Procedure: OPEN REDUCTION INTERNAL FIXATION ANKLE;  OPEN REDUCTION INTERNAL FIXATION LEFT ANKLE TRIMALLEOLAR FRACTURE ;  Surgeon: Rishabh Lantigua MD;  Location: [facility] OR     Family History   Problem Relation Age of Onset     Cancer Mother 83        Ovarian     Diabetes Mother         DM II, controlled with diet     Prostate Cancer Mother      Gastrointestinal Disease Sister         Gallbladder     Cancer Father 60        Lung     Prostate Cancer Father      Cancer Maternal Grandmother         Bladder     Prostate Cancer Maternal Grandmother      C.A.D. Paternal Grandmother      Cerebrovascular Disease Paternal Grandfather 88     Prostate Cancer Maternal Grandfather      Social History     Socioeconomic History     Marital status:      Spouse name: None     Number of children: None     Years of education: None     Highest education level: None   Occupational History     Occupation:    Social Needs     Financial resource strain: None     Food insecurity:     Worry: None     Inability: None     Transportation needs:     Medical: None     Non-medical: None   Tobacco Use     Smoking status: Never Smoker     Smokeless tobacco: Never Used   Substance and Sexual Activity     Alcohol use: Yes     Alcohol/week: 0.0 - 0.5 oz     Drug use: No     Sexual activity: Never   Lifestyle     Physical activity:     Days per week: None     Minutes per session: None     Stress: None   Relationships     Social connections:     Talks on phone: None     Gets together: None     Attends Mosque service: None     Active member of club or organization: None     Attends meetings of clubs or organizations: None     Relationship status: None     Intimate partner violence:     Fear of current or ex  "partner: None     Emotionally abused: None     Physically abused: None     Forced sexual activity: None   Other Topics Concern      Service Not Asked     Blood Transfusions No     Caffeine Concern No     Comment: None     Occupational Exposure No     Hobby Hazards No     Sleep Concern No     Stress Concern No     Comment: , doing well now     Weight Concern No     Special Diet No     Back Care No     Exercise No     Comment: Walks 4x/week     Bike Helmet Not Asked     Seat Belt No     Self-Exams Not Asked   Social History Narrative    1/10/14     ( committed suicide in 2010)        How much exercise per week? 4 days    How much calcium per day? supplement       How much caffeine per day? none    How much vitamin D per day? supplement    Do you/your family wear seatbelts?  Yes    Do you/your family use safety helmets? No    Do you/your family use sunscreen? Yes    Do you/your family keep firearms in the home? No    Do you/your family have a smoke detector(s)? Yes        Do you feel safe in your home? Yes    Has anyone ever touched you in an unwanted manner? No     Explain Nan Brown CMA 02/04/2015           ROS  [unfilled]  PHQ-9 SCORE 2/4/2015 2/10/2016   PHQ-9 Total Score 1 -   PHQ-9 Total Score MyChart - 0     FELICITY-7 SCORE 2/10/2016   Total Score 0 (minimal anxiety)         EXAM:  Height 1.702 m (5' 7\"), weight 68 kg (149 lb 14.4 oz). Body mass index is 23.48 kg/m .  General - pleasant female in no acute distress.  Skin - no suspicious lesions or rashes  EENT-  PERRLA, euthyroid with out palpable nodules  Neck - supple without lymphadenopathy.  Abdomen - soft, nontender, nondistended, no masses or organomegaly noted.  Musculoskeletal - no gross deformities.      Breast Exam:  Breast: Without visible skin changes. No dimpling or lesions seen.   Breasts supple, non-tender with palpation, no dominant mass, nodularity, or nipple discharge noted bilaterally. Axillary nodes negative.      Pelvic " Exam:  EG/BUS: Normal genital architecture without lesions, erythema or abnormal secretions Bartholin's, Urethra, [place]'s normal   Urethral meatus: normal   Urethra: no masses, tenderness, or scarring   Bladder: no masses or tenderness   Vagina: atrophic, thin, dry with white and odorless  secretions  Cervix: no lesions and pale, dry, stenotic  Uterus: anteverted,  and small, smooth, firm, mobile w/o pain  Adnexa: Within normal limits and No masses, nodularity, tenderness  Rectum:anus normal       ASSESSMENT:  Normal gynecological exam     PLAN:   If UTI and/or abdominal bloating do not resolve please call for ultrasound order. Can be done at [facility] or near home.       Orders Placed This Encounter   Medications     finasteride (PROPECIA) 1 MG tablet     Sig: Take 1 mg by mouth         Sharri Real          Again, thank you for allowing me to participate in the care of your patient.      Sincerely,    Sharri Real MD

## 2019-06-06 NOTE — PATIENT INSTRUCTIONS

## 2019-06-06 NOTE — PROGRESS NOTES
Progress Note    SUBJECTIVE:  Susan J Kreye is an 69 year old, , who requests an Annual Preventive Exam.     Concerns today include: doing well but is currently worried about UTI (is seeing her primary care for this later today and declines UA here) as well as several days history of abdominal bloating.     Menstrual History:  postmenopausal    Last    Lab Results   Component Value Date    PAP NIL 01/10/2014     History of abnormal Pap smear: NO - age 63 - see link Cervical Cytology Screening Guidelines    Last No results found for: HPV16  Last No results found for: HPV18  Last No results found for: HRHPV    Mammogram current: yes  Last Mammogram:   No results found.     Last Colonoscopy:  No results found for this or any previous visit.      HISTORY:    Current Outpatient Medications on File Prior to Visit:  brimonidine-timolol (COMBIGAN) 0.2-0.5 % ophthalmic solution 1 drop 2 times daily.   Calcium Carbonate-Vitamin D (CALCIUM + D PO) Take 1,500 mg by mouth daily.   finasteride (PROPECIA) 1 MG tablet Take 1 mg by mouth   Multiple Vitamin (MULTI-VITAMIN PO) Take 1 tablet by mouth daily.   sertraline (ZOLOFT) 50 MG tablet Take 75 mg by mouth daily.     No current facility-administered medications on file prior to visit.   Allergies   Allergen Reactions     Penicillins      Vaginal infections     Immunization History   Administered Date(s) Administered     Influenza (IIV3) PF 2013     TDAP Vaccine (Adacel) 2014       OB History    Para Term  AB Living   1 1 1 0 0 2   SAB TAB Ectopic Multiple Live Births   0 0 0 0 1     Past Medical History:   Diagnosis Date     Depression      Glaucoma      Migraines      Osteoporosis      Osteoporosis      Past Surgical History:   Procedure Laterality Date     CYSTOCELE REPAIR       EYE SURGERY       OPEN REDUCTION INTERNAL FIXATION ANKLE  2013    Procedure: OPEN REDUCTION INTERNAL FIXATION ANKLE;  OPEN REDUCTION  INTERNAL FIXATION LEFT ANKLE TRIMALLEOLAR FRACTURE ;  Surgeon: Rishabh Lantigua MD;  Location: [facility]     Family History   Problem Relation Age of Onset     Cancer Mother 83        Ovarian     Diabetes Mother         DM II, controlled with diet     Prostate Cancer Mother      Gastrointestinal Disease Sister         Gallbladder     Cancer Father 60        Lung     Prostate Cancer Father      Cancer Maternal Grandmother         Bladder     Prostate Cancer Maternal Grandmother      C.A.D. Paternal Grandmother      Cerebrovascular Disease Paternal Grandfather 88     Prostate Cancer Maternal Grandfather      Social History     Socioeconomic History     Marital status:      Spouse name: None     Number of children: None     Years of education: None     Highest education level: None   Occupational History     Occupation:    Social Needs     Financial resource strain: None     Food insecurity:     Worry: None     Inability: None     Transportation needs:     Medical: None     Non-medical: None   Tobacco Use     Smoking status: Never Smoker     Smokeless tobacco: Never Used   Substance and Sexual Activity     Alcohol use: Yes     Alcohol/week: 0.0 - 0.5 oz     Drug use: No     Sexual activity: Never   Lifestyle     Physical activity:     Days per week: None     Minutes per session: None     Stress: None   Relationships     Social connections:     Talks on phone: None     Gets together: None     Attends Church service: None     Active member of club or organization: None     Attends meetings of clubs or organizations: None     Relationship status: None     Intimate partner violence:     Fear of current or ex partner: None     Emotionally abused: None     Physically abused: None     Forced sexual activity: None   Other Topics Concern      Service Not Asked     Blood Transfusions No     Caffeine Concern No     Comment: None     Occupational Exposure No     Hobby Hazards No     Sleep Concern  "No     Stress Concern No     Comment: , doing well now     Weight Concern No     Special Diet No     Back Care No     Exercise No     Comment: Walks 4x/week     Bike Helmet Not Asked     Seat Belt No     Self-Exams Not Asked   Social History Narrative    1/10/14     ( committed suicide in 2010)        How much exercise per week? 4 days    How much calcium per day? supplement       How much caffeine per day? none    How much vitamin D per day? supplement    Do you/your family wear seatbelts?  Yes    Do you/your family use safety helmets? No    Do you/your family use sunscreen? Yes    Do you/your family keep firearms in the home? No    Do you/your family have a smoke detector(s)? Yes        Do you feel safe in your home? Yes    Has anyone ever touched you in an unwanted manner? No     Explain Nan Brown CMA 02/04/2015           ROS  [unfilled]  PHQ-9 SCORE 2/4/2015 2/10/2016   PHQ-9 Total Score 1 -   PHQ-9 Total Score MyChart - 0     FELICITY-7 SCORE 2/10/2016   Total Score 0 (minimal anxiety)         EXAM:  Height 1.702 m (5' 7\"), weight 68 kg (149 lb 14.4 oz). Body mass index is 23.48 kg/m .  General - pleasant female in no acute distress.  Skin - no suspicious lesions or rashes  EENT-  PERRLA, euthyroid with out palpable nodules  Neck - supple without lymphadenopathy.  Abdomen - soft, nontender, nondistended, no masses or organomegaly noted.  Musculoskeletal - no gross deformities.      Breast Exam:  Breast: Without visible skin changes. No dimpling or lesions seen.   Breasts supple, non-tender with palpation, no dominant mass, nodularity, or nipple discharge noted bilaterally. Axillary nodes negative.      Pelvic Exam:  EG/BUS: Normal genital architecture without lesions, erythema or abnormal secretions Bartholin's, Urethra, [place]'s normal   Urethral meatus: normal   Urethra: no masses, tenderness, or scarring   Bladder: no masses or tenderness   Vagina: atrophic, thin, dry with white and odorless  " secretions  Cervix: no lesions and pale, dry, stenotic  Uterus: anteverted,  and small, smooth, firm, mobile w/o pain  Adnexa: Within normal limits and No masses, nodularity, tenderness  Rectum:anus normal       ASSESSMENT:  Normal gynecological exam     PLAN:   If UTI and/or abdominal bloating do not resolve please call for ultrasound order. Can be done at [facility] or near home.       Orders Placed This Encounter   Medications     finasteride (PROPECIA) 1 MG tablet     Sig: Take 1 mg by mouth         Sharri Real

## 2019-07-19 ENCOUNTER — TRANSFERRED RECORDS (OUTPATIENT)
Dept: HEALTH INFORMATION MANAGEMENT | Facility: CLINIC | Age: 69
End: 2019-07-19

## 2020-02-07 ENCOUNTER — TRANSFERRED RECORDS (OUTPATIENT)
Dept: HEALTH INFORMATION MANAGEMENT | Facility: CLINIC | Age: 70
End: 2020-02-07

## 2020-06-22 ENCOUNTER — TRANSFERRED RECORDS (OUTPATIENT)
Dept: HEALTH INFORMATION MANAGEMENT | Facility: CLINIC | Age: 70
End: 2020-06-22

## 2020-10-05 ENCOUNTER — TELEPHONE (OUTPATIENT)
Dept: DERMATOLOGY | Facility: CLINIC | Age: 70
End: 2020-10-05

## 2020-10-07 ENCOUNTER — TELEPHONE (OUTPATIENT)
Dept: DERMATOLOGY | Facility: CLINIC | Age: 70
End: 2020-10-07

## 2020-10-07 NOTE — TELEPHONE ENCOUNTER
FUTURE VISIT INFORMATION      FUTURE VISIT INFORMATION:    Date:     Time:     Location:   REFERRAL INFORMATION:    Referring provider:  Dr. Oneill     Referring providers clinic:  [facility]    Reason for visit/diagnosis  Female pattern alopecia     RECORDS REQUESTED FROM:       DATE RECEIVED: 10/7/20   NOTES STATUS DETAILS   OFFICE NOTE from referring provider Received    OFFICE NOTE from other specialist N/A    LAB RESULTS N/A    MEDICATION LIST Received    SCALP BIOPSY RESULTS Received    HAIR LOSS PHOTOS Received      Action    Action Taken Pending

## 2020-10-12 ENCOUNTER — MEDICAL CORRESPONDENCE (OUTPATIENT)
Dept: HEALTH INFORMATION MANAGEMENT | Facility: CLINIC | Age: 70
End: 2020-10-12

## 2021-01-19 ENCOUNTER — DOCUMENTATION ONLY (OUTPATIENT)
Dept: CARE COORDINATION | Facility: CLINIC | Age: 71
End: 2021-01-19

## 2021-02-09 ENCOUNTER — OFFICE VISIT (OUTPATIENT)
Dept: DERMATOLOGY | Facility: CLINIC | Age: 71
End: 2021-02-09
Payer: MEDICARE

## 2021-02-09 DIAGNOSIS — E55.9 VITAMIN D DEFICIENCY: ICD-10-CM

## 2021-02-09 DIAGNOSIS — L30.9 DERMATITIS: ICD-10-CM

## 2021-02-09 DIAGNOSIS — L65.9 NON-SCARRING ALOPECIA: Primary | ICD-10-CM

## 2021-02-09 DIAGNOSIS — L65.9 NON-SCARRING ALOPECIA: ICD-10-CM

## 2021-02-09 LAB
DEPRECATED CALCIDIOL+CALCIFEROL SERPL-MC: 41 UG/L (ref 20–75)
FERRITIN SERPL-MCNC: 107 NG/ML (ref 8–252)
IRON SATN MFR SERPL: 21 % (ref 15–46)
IRON SERPL-MCNC: 62 UG/DL (ref 35–180)
TIBC SERPL-MCNC: 300 UG/DL (ref 240–430)
TSH SERPL DL<=0.005 MIU/L-ACNC: 0.68 MU/L (ref 0.4–4)

## 2021-02-09 PROCEDURE — 83540 ASSAY OF IRON: CPT | Performed by: PATHOLOGY

## 2021-02-09 PROCEDURE — 82728 ASSAY OF FERRITIN: CPT | Performed by: PATHOLOGY

## 2021-02-09 PROCEDURE — 99204 OFFICE O/P NEW MOD 45 MIN: CPT | Mod: GC | Performed by: DERMATOLOGY

## 2021-02-09 PROCEDURE — 83550 IRON BINDING TEST: CPT | Performed by: PATHOLOGY

## 2021-02-09 PROCEDURE — 36415 COLL VENOUS BLD VENIPUNCTURE: CPT | Performed by: PATHOLOGY

## 2021-02-09 PROCEDURE — 84443 ASSAY THYROID STIM HORMONE: CPT | Performed by: PATHOLOGY

## 2021-02-09 PROCEDURE — 82306 VITAMIN D 25 HYDROXY: CPT | Performed by: DERMATOLOGY

## 2021-02-09 RX ORDER — PYRITHIONE ZINC 20 MG/ML
SHAMPOO TOPICAL DAILY
Qty: 240 ML | Refills: 5 | Status: SHIPPED | OUTPATIENT
Start: 2021-02-09 | End: 2021-07-03

## 2021-02-09 RX ORDER — FLUOCINOLONE ACETONIDE 0.11 MG/ML
OIL TOPICAL AT BEDTIME
Qty: 118 ML | Refills: 5 | Status: SHIPPED | OUTPATIENT
Start: 2021-02-09

## 2021-02-09 RX ORDER — MINOXIDIL 2.5 MG/1
2.5 TABLET ORAL DAILY
COMMUNITY

## 2021-02-09 ASSESSMENT — PAIN SCALES - GENERAL: PAINLEVEL: NO PAIN (0)

## 2021-02-09 NOTE — NURSING NOTE
Chief Complaint   Patient presents with     Hair Loss     Cheryl, is here for hairlosss and has been dealing with this for two to three years.       Ajay Moya EMT

## 2021-02-09 NOTE — LETTER
2/9/2021       RE: Susan J Kreye  [address]   [place] [address]     Dear Colleague,    Thank you for referring your patient, Susan J Kreye, to the [facility]MAT[facility] [place] at [facility]. Please see a copy of my visit note below.    [facility] Dermatology Note  Encounter Date: Feb 9, 2021  Office Visit     Dermatology Problem List:  1. Nonscarring alopecia, early androgenetic alopecia  - [facility] 7/[facility]: essentially normal scalp (possible early androgenetic alopecia)  2. Scalp dermatitis    ____________________________________________    Assessment & Plan:     # Nonscarring alopecia, clinically consistent with androgenetic alopecia. Concern for contact dermatitis vs seb derm.  - TSH, vit D, and iron studies  - Fluocinolone oil minimum of weekly application  - DHS zinc shampoo  - Referral to allergy for patch testing (suspect hair dye)    Procedures Performed:   None    Follow-up: 1 month, refer to patch testing, or earlier for new or changing lesions    Staff and Resident:     Nico Parmar MD  Dermatology Resident    Patient was seen and examined with the dermatology resident. I agree with the history, review of systems, physical examination, assessments and plan.    Sadaf Salgado MD  Professor and  Chair  Department of Dermatology  [facility]      ____________________________________________    CC: Hair Loss (Cheryl, is here for hairlosss and has been dealing with this for two to three years.)    HPI:  Ms. Susan J Kreye is a(n) 70 year old female who presents today as a new patient for hair loss, referred by  [facility]. Lisa [facility]ill [facility]. She has gradually progressive hair thinning on the vertex for over two years, not associated with triggers or stress. Never had scalp symptoms. She estimates 50% hair density loss. Also reports loss of eyebrow hair. She dyes her  hair frequently and has irritation from the products that goes away on its own. Had scalp biopsy showing essentially normal scalp, possible early androgenetic alopecia. She has tried minoxidil/finasteride solution, which caused irritation. She now takes finasteride 1 mg and minoxidil 2.5 mg daily. Also takes hair/skin/nail gummies, slow release iron, and krill oil. Unknown OTC shampoo and conditioner but washes hair about once weekly during Covid pandemic, previously every other day.    Patient is otherwise feeling well, without additional concerns.    Labs:  As above    Physical Exam:  Vitals: There were no vitals taken for this visit.  SKIN: Focused examination of scalp, face, nails was performed.  Layers of regrowth: 1st layer 1-2 cm fibers, 2nd layer: 4-5 cm, 3rd layer: 8-10 cm.   Layers of regrowth in vertex: 1st layer: 1-2 cm, 2nd layer: 3 cm, 3rd layer: 8 cm  Part width 1.1 cocipital, 1.2  vertex, 1.3  upper vertex, 1.2  frontal  Fine fiber hair regrowth diffusely in eyebrows.  Nails show no pitting, ridging, onycholysis, dystrophy, or color changes.   - No other lesions of concern on areas examined.     Medications:  Current Outpatient Medications   Medication     brimonidine-timolol (COMBIGAN) 0.2-0.5 % ophthalmic solution     Calcium Carbonate-Vitamin D (CALCIUM + D PO)     finasteride (PROPECIA) 1 MG tablet     minoxidil (LONITEN) 2.5 MG tablet     Multiple Vitamin (MULTI-VITAMIN PO)     sertraline (ZOLOFT) 50 MG tablet     No current facility-administered medications for this visit.       Past Medical History:   Patient Active Problem List   Diagnosis     Closed left ankle fracture     Family history of malignant neoplasm of ovary     Unspecified injury of muscle, fascia and tendon of right hip, sequela     Shoulder tendonitis, right     Abnormal EKG     Depression     Follow-up examination following surgery     Glaucoma     Hearing loss in right ear     Osteopenia     Hyperlipidemia     Past Medical  History:   Diagnosis Date     Depression      Glaucoma      Migraines      Osteoporosis      Osteoporosis      Copy to Dr. Lisa Oneill        Again, thank you for allowing me to participate in the care of your patient.      Sincerely,    Sadaf Salgaod MD

## 2021-02-09 NOTE — PATIENT INSTRUCTIONS
Start zinc shampoo.  Start fluocinolone oil at night.  We referred you to our allergy testing for scalp dermatitis.

## 2021-02-09 NOTE — PROGRESS NOTES
[facility] Dermatology Note  Encounter Date: Feb 9, 2021  Office Visit     Dermatology Problem List:  1. Nonscarring alopecia, early androgenetic alopecia  - [facility]opsy 7/[facility]: essentially normal scalp (possible early androgenetic alopecia)  2. Scalp dermatitis    ____________________________________________    Assessment & Plan:     # Nonscarring alopecia, clinically consistent with androgenetic alopecia. Concern for contact dermatitis vs seb derm.  - TSH, vit D, and iron studies  - Fluocinolone oil minimum of weekly application  - DHS zinc shampoo  - Referral to allergy for patch testing (suspect hair dye)    Procedures Performed:   None    Follow-up: 1 month, refer to patch testing, or earlier for new or changing lesions    Staff and Resident:     Nico Parmar MD  Dermatology Resident    Patient was seen and examined with the dermatology resident. I agree with the history, review of systems, physical examination, assessments and plan.    Sadaf Salgado MD  Professor and  Chair  Department of Dermatology  [facility]      ____________________________________________    CC: Hair Loss (Cheryl, is here for hairlosss and has been dealing with this for two to three years.)    HPI:  Ms. Susan J Kreye is a(n) 70 year old female who presents today as a new patient for hair loss, referred by  Dr. Lias [facility]ill [facility]. She has gradually progressive hair thinning on the vertex for over two years, not associated with triggers or stress. Never had scalp symptoms. She estimates 50% hair density loss. Also reports loss of eyebrow hair. She dyes her hair frequently and has irritation from the products that goes away on its own. Had scalp biopsy showing essentially normal scalp, possible early androgenetic alopecia. She has tried minoxidil/finasteride solution, which caused irritation. She now takes finasteride 1 mg and minoxidil 2.5 mg daily. Also takes  hair/skin/nail gummies, slow release iron, and krill oil. Unknown OTC shampoo and conditioner but washes hair about once weekly during Covid pandemic, previously every other day.    Patient is otherwise feeling well, without additional concerns.    Labs:  As above    Physical Exam:  Vitals: There were no vitals taken for this visit.  SKIN: Focused examination of scalp, face, nails was performed.  Layers of regrowth: 1st layer 1-2 cm fibers, 2nd layer: 4-5 cm, 3rd layer: 8-10 cm.   Layers of regrowth in vertex: 1st layer: 1-2 cm, 2nd layer: 3 cm, 3rd layer: 8 cm  Part width 1.1 cocipital, 1.2  vertex, 1.3  upper vertex, 1.2  frontal  Fine fiber hair regrowth diffusely in eyebrows.  Nails show no pitting, ridging, onycholysis, dystrophy, or color changes.   - No other lesions of concern on areas examined.     Medications:  Current Outpatient Medications   Medication     brimonidine-timolol (COMBIGAN) 0.2-0.5 % ophthalmic solution     Calcium Carbonate-Vitamin D (CALCIUM + D PO)     finasteride (PROPECIA) 1 MG tablet     minoxidil (LONITEN) 2.5 MG tablet     Multiple Vitamin (MULTI-VITAMIN PO)     sertraline (ZOLOFT) 50 MG tablet     No current facility-administered medications for this visit.       Past Medical History:   Patient Active Problem List   Diagnosis     Closed left ankle fracture     Family history of malignant neoplasm of ovary     Unspecified injury of muscle, fascia and tendon of right hip, sequela     Shoulder tendonitis, right     Abnormal EKG     Depression     Follow-up examination following surgery     Glaucoma     Hearing loss in right ear     Osteopenia     Hyperlipidemia     Past Medical History:   Diagnosis Date     Depression      Glaucoma      Migraines      Osteoporosis      Osteoporosis      Copy to Dr. Lisa Oneill

## 2021-02-16 ENCOUNTER — TELEPHONE (OUTPATIENT)
Dept: DERMATOLOGY | Facility: CLINIC | Age: 71
End: 2021-02-16

## 2021-02-16 NOTE — TELEPHONE ENCOUNTER
Prior Authorization Retail Medication Request    Medication/Dose: fluocinolone acetonide (DERMA SMOOTHE/FS BODY) 0.01 % external oil  ICD code (if different than what is on RX):  L30.9  Previously Tried and Failed:    Rationale:      Insurance Name:  Medicare  Insurance ID:  [id number]      Pharmacy Information (if different than what is on RX)  Name:  [facility]  Phone:  [phone]

## 2021-02-17 NOTE — TELEPHONE ENCOUNTER
PA Initiation    Medication: fluocinolone acetonide (DERMA SMOOTHE/FS BODY) 0.01 % oil -   Insurance Company: [employer] - Phone [phone] Fax [phone]  Pharmacy Filling the Rx: [id number] - [place], [place] - [address]  Filling Pharmacy Phone: [phone]  Filling Pharmacy Fax: [phone]  Start Date: 2/17/2021

## 2021-02-18 NOTE — TELEPHONE ENCOUNTER
FUTURE VISIT INFORMATION      FUTURE VISIT INFORMATION:    Date: 5.3.21    Time: 2:00    Location: [facility]  REFERRAL INFORMATION:    Referring provider:  Dr. Sadaf Salgado    Referring providers clinic:  [facility]    Reason for visit/diagnosis  Dermatitis/ patch testing consult/ appt sched per pt    RECORDS REQUESTED FROM:       Clinic name Comments Records Status Photos Status   [facility] Derm 2.9.21  Dr. Salgado Epic Epic

## 2021-02-19 ENCOUNTER — TELEPHONE (OUTPATIENT)
Dept: DERMATOLOGY | Facility: CLINIC | Age: 71
End: 2021-02-19

## 2021-02-19 NOTE — TELEPHONE ENCOUNTER
Prior Authorization Retail Medication Request    Medication/Dose: fluocinolone acetonide (DERMA SMOOTHE/FS BODY) 0.01 % external oil  ICD code (if different than what is on RX): [L30.9]   Previously Tried and Failed:  See Chart  Rationale:      Insurance Name: Medicare  Insurance ID: [id number]      Pharmacy Information (if different than what is on RX)  Name:  [facility]  Phone:  [phone]

## 2021-02-22 NOTE — TELEPHONE ENCOUNTER
Patient calling to check on the status of PA, advised it has been initiated but we are still waiting for insurance to make a determination.

## 2021-03-09 ENCOUNTER — OFFICE VISIT (OUTPATIENT)
Dept: DERMATOLOGY | Facility: CLINIC | Age: 71
End: 2021-03-09
Payer: MEDICARE

## 2021-03-09 DIAGNOSIS — L65.9 NON-SCARRING ALOPECIA: Primary | ICD-10-CM

## 2021-03-09 DIAGNOSIS — L30.9 DERMATITIS: ICD-10-CM

## 2021-03-09 PROCEDURE — 99207 PR NO CHARGE LOS: CPT | Performed by: DERMATOLOGY

## 2021-03-09 NOTE — LETTER
3/9/2021       RE: Susan J Kreye  [address]   [place] [address]     Dear Colleague,    Thank you for referring your patient, Susan J Kreye, to the [facility]. Please see a copy of my visit note below.    HairMetrix Summary (3/9/2021)  Patient presents for hair metrix examination as noted below.  Sadaf Salgado MD    Frontal anterior    Mid scalp    Vertex    Occipital    Right temporal    Left temporal        Again, thank you for allowing me to participate in the care of your patient.      Sincerely,    Sadaf Salgado MD

## 2021-03-10 ENCOUNTER — TELEPHONE (OUTPATIENT)
Dept: DERMATOLOGY | Facility: CLINIC | Age: 71
End: 2021-03-10

## 2021-03-10 NOTE — TELEPHONE ENCOUNTER
"Left message for pt to contact clinic. Please Inform pt of Dr Salgado's recommendations copied below.     She also needs to be scheduled in Allergy sooner if possible. Consult with Dr Harris can be virtual.    \"Trina White, please contact the patient and tell her to discontinue using dermasmooth FS. She reported significant side effects when she came in today for hair metrix.  Also, please tell her she should hold on coloring her hair until she sees Dr. Harris. Any chance we can get her in sooner to see Brian?   Jersey Talavera\"   "

## 2021-03-10 NOTE — TELEPHONE ENCOUNTER
Relayed that Dermasmooth is the Fluocinolone. Cheryl understood.  E-mail link sent to sign up for [employer]

## 2021-03-10 NOTE — TELEPHONE ENCOUNTER
"[facility] Call Center    Phone Message    May a detailed message be left on voicemail: yes     Reason for Call: Other: `      Pt returned call from Clinic.  relayed information in note of 3/10/21 (1:41pm).    Pt was not clear which medication to discontinue; \"dermasmooth FS\" did not sound familiar to Pt.     Pt states/is scheduled with Dr Harris on 5/3/21    Please call Pt to discuss.    Thanks!    Action Taken: Message routed to:  [facility] Derm    Travel Screening: Not Applicable                                                                        "

## 2021-03-21 ENCOUNTER — HEALTH MAINTENANCE LETTER (OUTPATIENT)
Age: 71
End: 2021-03-21

## 2021-03-29 NOTE — PROGRESS NOTES
HairMetrix (03/09/2021)    Frontal anterior    Midscalp    Vertex    Occipital    Right temporal    Left temporal

## 2021-03-30 ENCOUNTER — OFFICE VISIT (OUTPATIENT)
Dept: DERMATOLOGY | Facility: CLINIC | Age: 71
End: 2021-03-30
Payer: MEDICARE

## 2021-03-30 DIAGNOSIS — L30.9 DERMATITIS: ICD-10-CM

## 2021-03-30 DIAGNOSIS — L65.9 NON-SCARRING ALOPECIA: Primary | ICD-10-CM

## 2021-03-30 PROCEDURE — 99213 OFFICE O/P EST LOW 20 MIN: CPT | Performed by: DERMATOLOGY

## 2021-03-30 RX ORDER — BIOTIN 10 MG
10000 TABLET ORAL DAILY
COMMUNITY

## 2021-03-30 ASSESSMENT — PAIN SCALES - GENERAL: PAINLEVEL: NO PAIN (0)

## 2021-03-30 NOTE — LETTER
3/30/2021       RE: Susan J Kreye  [address]   [place] [address]     Dear Colleague,    Thank you for referring your patient, Susan J Kreye, to the [facility] [place] at [facility]. Please see a copy of my visit note below.    [facility] Dermatology Note  Encounter Date: Mar 30, 2021  Office Visit     Dermatology Problem List:  1. Nonscarring alopecia, early androgenetic alopecia with scalp dermatitis and recent reaction to fluocinolone scalp oil  - [facility] 7/[facility]: essentially normal scalp (possible early androgenetic alopecia)  2. Scalp dermatitis, allergy testing is pending    ____________________________________________    Assessment & Plan:     # Nonscarring alopecia  Clinically consistent with androgenetic alopecia with some potential component of contact dermatitis v. seb derm, biopsied [facility]. TSH, vit D, and iron studies all wnl.   - Fluocinolone oil minimum of weekly application but was unable to tolerate due to skin reaction; discussed the following for her at this visit until she is seen by Dr. Harris  Safflower or coconut oil could help flaking on scalp. Test small area on arm first to ensure this does not cause a reaction. Then can apply to scalp, leave on for several hours, and follow with shampoo.  - DHS zinc shampoo  - Patch test appointment for consult: pending    Procedures Performed:   None    Follow-up after allergy evaluation is completed, most likely this summer    Staff and Medical Student:    Yandel Mosher, MS4    Staff Physician:  I was present with the medical student who participated in the service and in the documentation of the note. I have verified the history and personally performed the physical exam and medical decision making. I agree with the assessment and plan of care as documented in the note.     Sadaf Salgado,  MD  Professor and Chair  Department of Dermatology  [facility]: Phone: [phone], Fax:[phone]  [facility]: Phone: [phone], Fax: [phone]              ____________________________________________    CC: No chief complaint on file.    HPI:  Ms. Susan J Kreye is a(n) 71 year old female who presents today as a return patient for hair loss. We recommended starting Zinc shampoo and fluocinolone oil at her most recent appointment on 2/9/2021. Since then she has been using the Zinc shampoo 3-4 times a week.    - Current tx: zinc shampoo every other day, allergic reaction to fluocinolone oil (sores accross shoulder blades), oral minoxidil (previously on finasteride)  - Scalp pain: no  - Scalp burning: no  - Scalp itching: no  - Eyebrow or eyelash changes: stable from last visit  - Nail changes: no  - Overall course: stable to slightly worse      Patient is otherwise feeling well, without additional concerns.    Labs:  As above    Physical Exam:  Vitals: There were no vitals taken for this visit.  SKIN: Focused examination of scalp, face, back was performed.  Layers of regrowth: 1st layer 2 cm fibers, 2nd layer: 6 cm, 3rd layer: 12-14 cm.   Part width 1.1 front, 1.2  vertex  Scaling present on the scalp  Negative hair pull tests  Nails show no pitting, ridging, onycholysis, dystrophy, or color changes.   Back: erythematous and crusted papules present on upper back.    Medications:  Current Outpatient Medications   Medication     brimonidine-timolol (COMBIGAN) 0.2-0.5 % ophthalmic solution     Calcium Carbonate-Vitamin D (CALCIUM + D PO)     finasteride (PROPECIA) 1 MG tablet     fluocinolone acetonide (DERMA SMOOTHE/FS BODY) 0.01 % external oil     minoxidil (LONITEN) 2.5 MG tablet     Multiple Vitamin (MULTI-VITAMIN PO)     Pyrithione Zinc (DHS ZINC) 2 % SHAM     sertraline (ZOLOFT) 50 MG tablet     No  current facility-administered medications for this visit.       Past Medical History:   Patient Active Problem List   Diagnosis     Closed left ankle fracture     Family history of malignant neoplasm of ovary     Unspecified injury of muscle, fascia and tendon of right hip, sequela     Shoulder tendonitis, right     Abnormal EKG     Depression     Follow-up examination following surgery     Glaucoma     Hearing loss in right ear     Osteopenia     Hyperlipidemia     Past Medical History:   Diagnosis Date     Depression      Glaucoma      Migraines      Osteoporosis      Osteoporosis      Copy to Dr. Lisa Oneill    [employer] (3/30/2021)    Frontal anterior    Midscalp    Vertex    Occipital    Right temporal    Left temporal      Again, thank you for allowing me to participate in the care of your patient.      Sincerely,    Sadaf Salgado MD

## 2021-03-30 NOTE — PATIENT INSTRUCTIONS
Preventive Care:    Colorectal Cancer Screening: During our visit today, we discussed that it is recommended you receive colorectal cancer screening. Please call or make an appointment with your primary care provider to discuss this. You may also call the  [facility] scheduling line ([phone]) to set up a colonoscopy appointment.      Scalp health  - Safflower or coconut oil can help flaking on scalp. Test small area on arm first to ensure this does not cause a reaction. Then can apply to scalp, leave on for several hours, and follow with shampoo.

## 2021-03-30 NOTE — PROGRESS NOTES
[employer] (3/30/2021)    Frontal anterior    Midscalp    Vertex    Occipital    Right temporal    Left temporal

## 2021-03-30 NOTE — NURSING NOTE
"Chief Complaint   Patient presents with     Derm Problem     Cheryl is here today for hairloss. She states \" no changes since my last visit\"     Sadaf Nicole, RMMARISOL  "

## 2021-03-30 NOTE — PROGRESS NOTES
[facility] Dermatology Note  Encounter Date: Mar 30, 2021  Office Visit     Dermatology Problem List:  1. Nonscarring alopecia, early androgenetic alopecia with scalp dermatitis and recent reaction to fluocinolone scalp oil  - [facility] 7/[facility]: essentially normal scalp (possible early androgenetic alopecia)  2. Scalp dermatitis, allergy testing is pending    ____________________________________________    Assessment & Plan:     # Nonscarring alopecia  Clinically consistent with androgenetic alopecia with some potential component of contact dermatitis v. seb derm, biopsied at [facility]. TSH, vit D, and iron studies all wnl.   - Fluocinolone oil minimum of weekly application but was unable to tolerate due to skin reaction; discussed the following for her at this visit until she is seen by Dr. Harris  Safflower or coconut oil could help flaking on scalp. Test small area on arm first to ensure this does not cause a reaction. Then can apply to scalp, leave on for several hours, and follow with shampoo.  - DHS zinc shampoo  - Patch test appointment for consult: pending    Procedures Performed:   None    Follow-up after allergy evaluation is completed, most likely this summer    Staff and Medical Student:    Yandel Mosher, MS4    Staff Physician:  I was present with the medical student who participated in the service and in the documentation of the note. I have verified the history and personally performed the physical exam and medical decision making. I agree with the assessment and plan of care as documented in the note.     Sadaf Salgado MD  Professor and Chair  Department of Dermatology  [facility]: Phone: [phone], Fax:[phone]  [facility]: Phone: [phone], Fax:  [phone]              ____________________________________________    CC: No chief complaint on file.    HPI:  Ms. Susan J Kreye is a(n) 71 year old female who presents today as a return patient for hair loss. We recommended starting Zinc shampoo and fluocinolone oil at her most recent appointment on 2/9/2021. Since then she has been using the Zinc shampoo 3-4 times a week.    - Current tx: zinc shampoo every other day, allergic reaction to fluocinolone oil (sores accross shoulder blades), oral minoxidil (previously on finasteride)  - Scalp pain: no  - Scalp burning: no  - Scalp itching: no  - Eyebrow or eyelash changes: stable from last visit  - Nail changes: no  - Overall course: stable to slightly worse      Patient is otherwise feeling well, without additional concerns.    Labs:  As above    Physical Exam:  Vitals: There were no vitals taken for this visit.  SKIN: Focused examination of scalp, face, back was performed.  Layers of regrowth: 1st layer 2 cm fibers, 2nd layer: 6 cm, 3rd layer: 12-14 cm.   Part width 1.1 front, 1.2  vertex  Scaling present on the scalp  Negative hair pull tests  Nails show no pitting, ridging, onycholysis, dystrophy, or color changes.   Back: erythematous and crusted papules present on upper back.    Medications:  Current Outpatient Medications   Medication     brimonidine-timolol (COMBIGAN) 0.2-0.5 % ophthalmic solution     Calcium Carbonate-Vitamin D (CALCIUM + D PO)     finasteride (PROPECIA) 1 MG tablet     fluocinolone acetonide (DERMA SMOOTHE/FS BODY) 0.01 % external oil     minoxidil (LONITEN) 2.5 MG tablet     Multiple Vitamin (MULTI-VITAMIN PO)     Pyrithione Zinc (DHS ZINC) 2 % SHAM     sertraline (ZOLOFT) 50 MG tablet     No current facility-administered medications for this visit.       Past Medical History:   Patient Active Problem List   Diagnosis     Closed left ankle fracture     Family history of malignant neoplasm of ovary     Unspecified injury of muscle,  fascia and tendon of right hip, sequela     Shoulder tendonitis, right     Abnormal EKG     Depression     Follow-up examination following surgery     Glaucoma     Hearing loss in right ear     Osteopenia     Hyperlipidemia     Past Medical History:   Diagnosis Date     Depression      Glaucoma      Migraines      Osteoporosis      Osteoporosis      Copy to Dr. Lisa Oneill

## 2021-03-31 NOTE — PROGRESS NOTES
[facility] Dermato-Allergy Record     Allergy Problem List:    Specialty Problems     None          CC:   No chief complaint on file.        Encounter Date: Apr 2, 2021    History of Present Illness:  I have reviewed the teledermatology  information and the nursing intake corresponding to this issue. Susan J Kreye is a 71 year old female who presents as a teledermatology consult for the following information take directly from the nursing note (kept in this note for patient safety) and phone/video call performed by myself:     - Has hair loss, has had rashes and reactions to various treatments.  - 1 liquid hair loss treatment was mixture of minoxidil and something else that she cannot remember, reacted to it last summer. Had rash around face and bumps on back and arms. 1/2 inch around hairline red and itchy. Seemed to be pretty quickly after applying. Red itchy rash resolved within a week of stopping. Bumps on back stayed.  - Fluocinolone oil - could feel itching as soon as she put it on. Similarly, 1/2 in around hair line red and itchy.    Past Medical History:   Patient Active Problem List   Diagnosis     Closed left ankle fracture     Family history of malignant neoplasm of ovary     Unspecified injury of muscle, fascia and tendon of right hip, sequela     Shoulder tendonitis, right     Abnormal EKG     Depression     Follow-up examination following surgery     Glaucoma     Hearing loss in right ear     Osteopenia     Hyperlipidemia     Past Medical History:   Diagnosis Date     Depression      Glaucoma      Migraines      Osteoporosis      Osteoporosis        Allergy History:     Allergies   Allergen Reactions     Penicillins      Vaginal infections       Social History:   reports that she has never smoked. She has never used smokeless tobacco. She reports current alcohol use. She reports that she does not use drugs.      Family History:  Family History   Problem Relation Age of Onset     Cancer Mother 83         Ovarian     Diabetes Mother         DM II, controlled with diet     Prostate Cancer Mother      Gastrointestinal Disease Sister         Gallbladder     Cancer Father 60        Lung     Prostate Cancer Father      Cancer Maternal Grandmother         Bladder     Prostate Cancer Maternal Grandmother      C.AShashiD. Paternal Grandmother      Cerebrovascular Disease Paternal Grandfather 88     Prostate Cancer Maternal Grandfather        Medications:  Current Outpatient Medications   Medication Sig Dispense Refill     Biotin 10 MG TABS tablet Take 10,000 mcg by mouth daily       brimonidine-timolol (COMBIGAN) 0.2-0.5 % ophthalmic solution 1 drop 2 times daily.       Calcium Carbonate-Vitamin D (CALCIUM + D PO) Take 1,500 mg by mouth daily.       finasteride (PROPECIA) 1 MG tablet Take 1 mg by mouth       fluocinolone acetonide (DERMA SMOOTHE/FS BODY) 0.01 % external oil Apply topically At Bedtime (Patient not taking: Reported on 3/30/2021) 118 mL 5     minoxidil (LONITEN) 2.5 MG tablet Take 2.5 mg by mouth daily       Multiple Vitamin (MULTI-VITAMIN PO) Take 1 tablet by mouth daily.       Pyrithione Zinc (DHS ZINC) 2 % SHAM Externally apply topically daily 240 mL 5     sertraline (ZOLOFT) 50 MG tablet Take 75 mg by mouth daily.           Additional comments and observations from review of the patient s chart including the following:    See notes    ROS: Patient generally feeling well today     Physical Examination:  N/A      Allergy tests:    Past Allergy tests None    Current Allergy tests (or planned)      Order for PATCH TESTS    [x] Outpatient  [] Inpatient: [facility]..../ Bed ....      Skin Atopy (atopic dermatitis) [] Yes   [x] No  Contact allergies:   [x] Yes   [] No (allergic to wool, red rash, itchy bumps)  Urticaria/Angioedema  [] Yes   [x] No (in high school, had strep infection on legs)  Rhinitis/Sinusitis:   [x] Yes   [] No   [x] seasonal (early spring)  [] perennial            Allergic Asthma:   [] Yes   [x]  No  Pets :  [] Yes   [x] No  Which?...... (Allergic to cats, itchy eyes, runny nose)  Food Allergy:   [x] Yes   [x] No (migraines from ricotta cheese, chocolate, cigarette smoke)  Drug allergies:    [] Yes   [x] No (yeast infections following penicillins)            Reason for tests (suspected allergy): suspected allergy  Known previous allergies: seasonal (early spring), cats, wool intolerance     Standardized panels  [x] Standard panel (40 tests)  [x] Preservatives & Antimicrobials (31 tests)  [x] Emulsifiers & Additives (25 tests)   [x] Perfumes/Flavours & Plants (25 tests)  [x] Hairdresser panel (12 tests)  [] Rubber Chemicals (22 tests)  [] Plastics (26 tests)  [] Colorants/Dyes/Food additives (20 tests)  [] Metals (implants/dental) (24 tests)  [] Local anaesthetics/NSAIDs (14 tests)  [] Antibiotics & Antimycotics (14 tests)   [x] Corticosteroids (15 tests)   [] Photopatch test (62 tests)   [] others: ...      [x] Patient's own products: fluocinolone oil solution and hair dye ...    DO NOT test if chemical or biological identity is unknown!     always ask from patient the product information and safety sheets (MSDS)   [] Atopy screen prick test (Atopic predisposition): ...    [] Patient needs consultation with Allergy team (changes of tests may apply)  [] Tests discussed with Allergy team (can have direct appointment for patch tests)     Order for PRICK TESTS     [x] Outpatient  - Inpatient: [facility]..../ Bed ....        Standardized prick panels  [x] Atopic panel (20 tests)  - Pediatric Panel (12 tests)  - Milk, Meat, Eggs, Grains (20 tests)   - Dust, Epithelia, Feathers (10 tests)  - Fish, Seafood, Shellfish (17 tests)  - Nuts, Beans (14 tests)  - Spice, Vegetable, Fruit (17 tests)  - Others: ...      - Patient's own products: ...    DO NOT test if chemical or biological identity is unknown!     always ask from patient the product information and safety sheets (MSDS)     Standardized intradermal tests  [x]  Penicillium notatum [x] Aspergillus fumigatus [x] House dust mites  - Bee venom   - Wasp venom !!Specific protocol with dilutions!!  - Others: ...    - Patient needs consultation with Allergy team (changes of tests may apply)  - Tests discussed with Allergy team (can have direct appointment for allergy tests)     Impression/Plan:    1) Suspicion of contact allergy.    Minoxidil unlikely as she has taken oral minoxidil without problem. Could be reacting to additive vs a corticosteroid in hair solutions. She does have atopy as she has irritant reaction to wool. Will do patch and prick testing.    Patch and Prick testing as above      Patient counseling:  Advised to come in for testing and bring the fluocinolone oil with her.    Follow-up: in Derm-Allergy clinic for patch and prick testing    Referred by: Dr. Sadaf Salgado    Thank you for the opportunity be involved in the care of this patient.     Staff: Yandel Mosher MD4, Trina Merino and Ana Bernardo, RN  Staff Physician Comments:  I was present with the medical student who participated in the service and in the documentation of the note. I have verified the history and personally performed the physical exam and medical decision making. I agree with the assessment and plan as documented in the note. I have reviewed and if necessary amended the note.      Brian Harris MD  Professor  Head of Dermato-Allergy Division  Department of Dermatology  [facility], [facility]    _____________________________________________________________________________    Teledermatology information:  - Location of patient: Home  - Patient presented in referral from: Dr. Sadaf Salgado  - Location of teledermatologist:  [facility] [place] (, [facility], [place])  - Reason teledermatology is appropriate:  of National Emergency Regarding Coronavirus disease (COVID 19) Outbreak  - Method of transmission:  Phone only  - Service start  time: 8:45 AM  - Service end time: 9:15 AM  - Date of report: 03/31/21      I spent a total of 30 minutes for telemedicine consult with Susan J Kreye during today s video meeting. Over 50% of this time was spent counseling the patient and/or coordinating care. Please see Assessment and Plan for details.

## 2021-04-02 ENCOUNTER — VIRTUAL VISIT (OUTPATIENT)
Dept: ALLERGY | Facility: CLINIC | Age: 71
End: 2021-04-02
Payer: MEDICARE

## 2021-04-02 DIAGNOSIS — J30.89 ALLERGIC RHINOCONJUNCTIVITIS, SEASONAL AND PERENNIAL: ICD-10-CM

## 2021-04-02 DIAGNOSIS — J30.2 ALLERGIC RHINOCONJUNCTIVITIS, SEASONAL AND PERENNIAL: ICD-10-CM

## 2021-04-02 DIAGNOSIS — H10.10 ALLERGIC RHINOCONJUNCTIVITIS, SEASONAL AND PERENNIAL: ICD-10-CM

## 2021-04-02 DIAGNOSIS — L23.89 ALLERGIC CONTACT DERMATITIS DUE TO OTHER AGENTS: Primary | ICD-10-CM

## 2021-04-02 PROCEDURE — 99214 OFFICE O/P EST MOD 30 MIN: CPT | Mod: 95 | Performed by: DERMATOLOGY

## 2021-04-02 NOTE — LETTER
4/2/2021         RE: Susan J Kreye  [address]   [place] [address]        Dear Colleague,    Thank you for referring your patient, Susan J Kreye, to the [facility] [place]. Please see a copy of my visit note below.    [facility] Dermato-Allergy Record     Allergy Problem List:    Specialty Problems     None          CC:   No chief complaint on file.        Encounter Date: Apr 2, 2021    History of Present Illness:  I have reviewed the teledermatology  information and the nursing intake corresponding to this issue. Susan J Kreye is a 71 year old female who presents as a teledermatology consult for the following information take directly from the nursing note (kept in this note for patient safety) and phone/video call performed by myself:     - Has hair loss, has had rashes and reactions to various treatments.  - 1 liquid hair loss treatment was mixture of minoxidil and something else that she cannot remember, reacted to it last summer. Had rash around face and bumps on back and arms. 1/2 inch around hairline red and itchy. Seemed to be pretty quickly after applying. Red itchy rash resolved within a week of stopping. Bumps on back stayed.  - Fluocinolone oil - could feel itching as soon as she put it on. Similarly, 1/2 in around hair line red and itchy.    Past Medical History:   Patient Active Problem List   Diagnosis     Closed left ankle fracture     Family history of malignant neoplasm of ovary     Unspecified injury of muscle, fascia and tendon of right hip, sequela     Shoulder tendonitis, right     Abnormal EKG     Depression     Follow-up examination following surgery     Glaucoma     Hearing loss in right ear     Osteopenia     Hyperlipidemia     Past Medical History:   Diagnosis Date     Depression      Glaucoma      Migraines      Osteoporosis      Osteoporosis        Allergy History:     Allergies   Allergen Reactions     Penicillins      Vaginal infections        Social History:   reports that she has never smoked. She has never used smokeless tobacco. She reports current alcohol use. She reports that she does not use drugs.      Family History:  Family History   Problem Relation Age of Onset     Cancer Mother 83        Ovarian     Diabetes Mother         DM II, controlled with diet     Prostate Cancer Mother      Gastrointestinal Disease Sister         Gallbladder     Cancer Father 60        Lung     Prostate Cancer Father      Cancer Maternal Grandmother         Bladder     Prostate Cancer Maternal Grandmother      C.A.D. Paternal Grandmother      Cerebrovascular Disease Paternal Grandfather 88     Prostate Cancer Maternal Grandfather        Medications:  Current Outpatient Medications   Medication Sig Dispense Refill     Biotin 10 MG TABS tablet Take 10,000 mcg by mouth daily       brimonidine-timolol (COMBIGAN) 0.2-0.5 % ophthalmic solution 1 drop 2 times daily.       Calcium Carbonate-Vitamin D (CALCIUM + D PO) Take 1,500 mg by mouth daily.       finasteride (PROPECIA) 1 MG tablet Take 1 mg by mouth       fluocinolone acetonide (DERMA SMOOTHE/FS BODY) 0.01 % external oil Apply topically At Bedtime (Patient not taking: Reported on 3/30/2021) 118 mL 5     minoxidil (LONITEN) 2.5 MG tablet Take 2.5 mg by mouth daily       Multiple Vitamin (MULTI-VITAMIN PO) Take 1 tablet by mouth daily.       Pyrithione Zinc (DHS ZINC) 2 % SHAM Externally apply topically daily 240 mL 5     sertraline (ZOLOFT) 50 MG tablet Take 75 mg by mouth daily.           Additional comments and observations from review of the patient s chart including the following:    See notes    ROS: Patient generally feeling well today     Physical Examination:  N/A      Allergy tests:    Past Allergy tests None    Current Allergy tests (or planned)      Order for PATCH TESTS    [x] Outpatient  [] Inpatient: [facility]..../ Bed ....      Skin Atopy (atopic dermatitis) [] Yes   [x] No  Contact allergies:   [x] Yes    [] No (allergic to wool, red rash, itchy bumps)  Urticaria/Angioedema  [] Yes   [x] No (in high school, had strep infection on legs)  Rhinitis/Sinusitis:   [x] Yes   [] No   [x] seasonal (early spring)  [] perennial            Allergic Asthma:   [] Yes   [x] No  Pets :  [] Yes   [x] No  Which?...... (Allergic to cats, itchy eyes, runny nose)  Food Allergy:   [x] Yes   [x] No (migraines from ricotta cheese, chocolate, cigarette smoke)  Drug allergies:    [] Yes   [x] No (yeast infections following penicillins)            Reason for tests (suspected allergy): suspected allergy  Known previous allergies: seasonal (early spring), cats, wool intolerance     Standardized panels  [x] Standard panel (40 tests)  [x] Preservatives & Antimicrobials (31 tests)  [x] Emulsifiers & Additives (25 tests)   [x] Perfumes/Flavours & Plants (25 tests)  [x] Hairdresser panel (12 tests)  [] Rubber Chemicals (22 tests)  [] Plastics (26 tests)  [] Colorants/Dyes/Food additives (20 tests)  [] Metals (implants/dental) (24 tests)  [] Local anaesthetics/NSAIDs (14 tests)  [] Antibiotics & Antimycotics (14 tests)   [x] Corticosteroids (15 tests)   [] Photopatch test (62 tests)   [] others: ...      [x] Patient's own products: fluocinolone oil solution and hair dye ...    DO NOT test if chemical or biological identity is unknown!     always ask from patient the product information and safety sheets (MSDS)   [] Atopy screen prick test (Atopic predisposition): ...    [] Patient needs consultation with Allergy team (changes of tests may apply)  [] Tests discussed with Allergy team (can have direct appointment for patch tests)     Order for PRICK TESTS     [x] Outpatient  - Inpatient: [facility]..../ Bed ....        Standardized prick panels  [x] Atopic panel (20 tests)  - Pediatric Panel (12 tests)  - Milk, Meat, Eggs, Grains (20 tests)   - Dust, Epithelia, Feathers (10 tests)  - Fish, Seafood, Shellfish (17 tests)  - Nuts, Beans (14 tests)  - Spice,  Vegetable, Fruit (17 tests)  - Others: ...      - Patient's own products: ...    DO NOT test if chemical or biological identity is unknown!     always ask from patient the product information and safety sheets (MSDS)     Standardized intradermal tests  [x] Penicillium notatum [x] Aspergillus fumigatus [x] House dust mites  - Bee venom   - Wasp venom !!Specific protocol with dilutions!!  - Others: ...    - Patient needs consultation with Allergy team (changes of tests may apply)  - Tests discussed with Allergy team (can have direct appointment for allergy tests)     Impression/Plan:    1) Suspicion of contact allergy.    Minoxidil unlikely as she has taken oral minoxidil without problem. Could be reacting to additive vs a corticosteroid in hair solutions. She does have atopy as she has irritant reaction to wool. Will do patch and prick testing.    Patch and Prick testing as above      Patient counseling:  Advised to come in for testing and bring the fluocinolone oil with her.    Follow-up: in Derm-Allergy clinic for patch and prick testing    Referred by: Dr. Sadaf Salgado    Thank you for the opportunity be involved in the care of this patient.     Staff: Yandel Mosher MD4, Trina Merino and Ana Bernardo, RN  Staff Physician Comments:  I was present with the medical student who participated in the service and in the documentation of the note. I have verified the history and personally performed the physical exam and medical decision making. I agree with the assessment and plan as documented in the note. I have reviewed and if necessary amended the note.      Brian Harris MD  Professor  Head of Dermato-Allergy Division  Department of Dermatology  [facility]    _____________________________________________________________________________    Teledermatology information:  - Location of patient: Home  - Patient presented in referral from: Dr. Sadaf Salgado  - Location of  teledermatologist:  [facility] [place] (, [facility], [place])  - Reason teledermatology is appropriate:  of National Emergency Regarding Coronavirus disease (COVID 19) Outbreak  - Method of transmission:  Phone only  - Service start time: 8:45 AM  - Service end time: 9:15 AM  - Date of report: 03/31/21      I spent a total of 30 minutes for telemedicine consult with Susan J Kreye during today s video meeting. Over 50% of this time was spent counseling the patient and/or coordinating care. Please see Assessment and Plan for details.       Again, thank you for allowing me to participate in the care of your patient.        Sincerely,        Brian Harris MD

## 2021-05-01 NOTE — PROGRESS NOTES
[facility] Dermato-allergology Note  Office visit  Encounter Date: May 3, 2021  ____________________________________________    CC: No chief complaint on file.      HPI:  Ms. Susan J Kreye is a(n) 71 year old female who presents today as a return patient for allergy consultation  - Follow-up in Derm-Allergy clinic for patch and prick testing as planned  - otherwise feeling well in usual state of health    Physical exam:  General: In no acute distress, well-developed, well-nourished  Eyes: no conjunctivitis  ENT: no signs of rhinitis   Pulmonary: no wheezing or coughing  Skin: Focused examination of the skin on test sites was performed = see test results below  No active skin lesions on test sites    Earlier History and Allergy exams:    - Has hair loss, has had rashes and reactions to various treatments.  - 1 liquid hair loss treatment was mixture of minoxidil and something else that she cannot remember, reacted to it last summer. Had rash around face and bumps on back and arms. 1/2 inch around hairline red and itchy. Seemed to be pretty quickly after applying. Red itchy rash resolved within a week of stopping. Bumps on back stayed.  - Fluocinolone oil - could feel itching as soon as she put it on. Similarly, 1/2 in around hair line red and itchy.  - was last summer on a solution of Minoxidil 0.5% and Finasterid 1.7% and reacted to it.    Past Medical History:   Patient Active Problem List   Diagnosis     Closed left ankle fracture     Family history of malignant neoplasm of ovary     Unspecified injury of muscle, fascia and tendon of right hip, sequela     Shoulder tendonitis, right     Abnormal EKG     Depression     Follow-up examination following surgery     Glaucoma     Hearing loss in right ear     Osteopenia     Hyperlipidemia     Past Medical History:   Diagnosis Date     Depression      Glaucoma      Migraines      Osteoporosis      Osteoporosis        Allergy History:     Allergies    Allergen Reactions     Penicillins      Vaginal infections       Social History:   reports that she has never smoked. She has never used smokeless tobacco. She reports current alcohol use. She reports that she does not use drugs.      Family History:  Family History   Problem Relation Age of Onset     Cancer Mother 83        Ovarian     Diabetes Mother         DM II, controlled with diet     Prostate Cancer Mother      Gastrointestinal Disease Sister         Gallbladder     Cancer Father 60        Lung     Prostate Cancer Father      Cancer Maternal Grandmother         Bladder     Prostate Cancer Maternal Grandmother      C.A.D. Paternal Grandmother      Cerebrovascular Disease Paternal Grandfather 88     Prostate Cancer Maternal Grandfather        Medications:  Current Outpatient Medications   Medication Sig Dispense Refill     Biotin 10 MG TABS tablet Take 10,000 mcg by mouth daily       brimonidine-timolol (COMBIGAN) 0.2-0.5 % ophthalmic solution 1 drop 2 times daily.       Calcium Carbonate-Vitamin D (CALCIUM + D PO) Take 1,500 mg by mouth daily.       finasteride (PROPECIA) 1 MG tablet Take 1 mg by mouth       fluocinolone acetonide (DERMA SMOOTHE/FS BODY) 0.01 % external oil Apply topically At Bedtime (Patient not taking: Reported on 4/2/2021) 118 mL 5     minoxidil (LONITEN) 2.5 MG tablet Take 2.5 mg by mouth daily       Multiple Vitamin (MULTI-VITAMIN PO) Take 1 tablet by mouth daily.       Pyrithione Zinc (DHS ZINC) 2 % SHAM Externally apply topically daily 240 mL 5     sertraline (ZOLOFT) 50 MG tablet Take 75 mg by mouth daily.         Allergy tests:    Past Allergy tests None    Current Allergy tests (or planned)      Order for PATCH TESTS    [x] Outpatient  [] Inpatient: Valerio..../ Bed ....      Skin Atopy (atopic dermatitis) [] Yes   [x] No  Contact allergies:   [x] Yes   [] No (allergic to wool, red rash, itchy bumps)  Urticaria/Angioedema  [] Yes   [x] No (in high school, had strep infection on  legs)  Rhinitis/Sinusitis:   [x] Yes   [] No   [x] seasonal (early spring)  [] perennial            Allergic Asthma:   [] Yes   [x] No  Pets :  [] Yes   [x] No  Which?...... (Allergic to cats, itchy eyes, runny nose)  Food Allergy:   [x] Yes   [x] No (migraines from ricotta cheese, chocolate, cigarette smoke)  Drug allergies:    [] Yes   [x] No (yeast infections following penicillins)            Reason for tests (suspected allergy): suspected allergy  Known previous allergies: seasonal (early spring), cats, wool intolerance     Standardized panels  [x] Standard panel (40 tests)  [x] Preservatives & Antimicrobials (31 tests)  [x] Emulsifiers & Additives (25 tests)   [x] Perfumes/Flavours & Plants (25 tests)  [x] Hairdresser panel (12 tests)  [] Rubber Chemicals (22 tests)  [] Plastics (26 tests)  [] Colorants/Dyes/Food additives (20 tests)  [] Metals (implants/dental) (24 tests)  [] Local anaesthetics/NSAIDs (14 tests)  [] Antibiotics & Antimycotics (14 tests)   [x] Corticosteroids (15 tests)   [] Photopatch test (62 tests)   [] others: ...      [x] Patient's own products: fluocinolone oil solution and hair dye ...    DO NOT test if chemical or biological identity is unknown!     always ask from patient the product information and safety sheets (MSDS)       Order for PRICK TESTS    Standardized prick panels  [x] Atopic panel (20 tests)  - Pediatric Panel (12 tests)  - Milk, Meat, Eggs, Grains (20 tests)   - Dust, Epithelia, Feathers (10 tests)  - Fish, Seafood, Shellfish (17 tests)  - Nuts, Beans (14 tests)  - Spice, Vegetable, Fruit (17 tests)  - Others: ...      - Patient's own products: ...    DO NOT test if chemical or biological identity is unknown!     always ask from patient the product information and safety sheets (MSDS)     Standardized intradermal tests  [x] Penicillium notatum [x] Aspergillus fumigatus [x] House dust mites  - Bee venom   - Wasp venom !!Specific protocol with dilutions!!  - Others:  ...    RESULTS & EVALUATION of PATCH TESTS    Patch test readings after     [x] 2 days, [] 3 days [x] 4 days, [] 5 days,    May 3, 2021 application of patch tests with results:    STANDARD Series        No Substance 2 days 4 days remarks    1 Tacho Mix [C] - -     2 Colophony - -     3  2-Mercaptobenzothiazole  - -      4 Methylisothiazolinone - -     5 Carba Mix - -     6 Thiuram Mix [A] - -     7 Bisphenol A Epoxy Resin - -     8 H-Gkxr-Uiyeisztaak-Formaldehyde Resin - -     9 Mercapto Mix [A] - -     10 Black Rubber Mix- PPD [B] - -     11 Potassium Dichromate  -  -     12 Balsam of [place] (Myroxylon Pereirae Resin) - -     13 Nickel Sulphate Hexahydrate - -     14 Mixed Dialkyl Thiourea - -     15 Paraben Mix [B] - -     16 Methyldibromo Glutaronitrile - -     17 Fragrance Mix - -     18 2-Bromo-2-Nitropropane-1,3-Diol (Bronopol) NA NA     19 Lyral - -     20 Tixocortol-21- Pivalate - -     21 Diazolidiyl Urea (Germall II) - -     22 Methyl Methacrylate - -     23 Cobalt (II) Chloride Hexahydrate - -     24 Fragrance Mix II  - -     25 Compositae Mix - -     26 Benzoyl Peroxide - -     27 Bacitracin - -     28 Formaldehyde - -     29 Methylchloroisothiazolinone / Methylisothiazolinone - -     30 Corticosteroid Mix NA NA     31 Sodium Lauryl Sulfate - -     32 Lanolin Alcohol - -     33 Turpentine - -     34 Cetylstearylalcohol - -     35 Chlorhexidine Dicluconate - -     36 Budenoside - -     37 Imidazolidinyl Urea  - -     38 Ethyl-2 Cyanoacrylate NA NA     39 Quaternium 15 (Dowicil 200) - -     40 Decyl Glucoside - -    PRESERVATIVES & ANTIMICROBIALS        No Substance 2 days 4 days remarks    1  1,2-Benzisothiazoline-3-One, Sodium Salt NA NA     2  1,3,5-Shaun (2-Hydroxyethyl) - Hexahydrotriazine (Grotan BK) - -     3 8-Obifybfrizqfd-6-Nitro-1, 3-Propanediol - -     4  3, 4, 4' - Triclocarban - -     5 4 - Chloro - 3 - Cresol - -     6 4 - Chloro - 4 - Xylenol (PCMX) - -     7 7-Ethylbicyclooxazolidine (Bioban  [id number]) - -     8 Benzalkonium Chloride NA NA     9 Benzyl Alcohol - -     10 Cetalkonium Chloride - -     11 Cetylpyrimidine Chloride  - -     12 Chloroacetamide - -     13 DMDM Hydantoin - -     14 Glutaraldehyde - -     15 Triclosan - -     16 Glyoxal Trimeric Dihydrate - -     17 Iodopropynyl Butylcarbamate - -     18 Octylisothiazoline - -     19 Iodoform NA NA     20 (Nitrobutyl) Morpholine/(Ethylnitro-Trimethylene) Dimorpholine (Bioban P 1487) - -     21 Phenoxyethanol - -     22 Phenyl Salicylate NA NA     23 Povidone Iodine - -     24 Sodium Benzoate - -     25 Sodium Disulfite - -     26 Sorbic Acid - -     27 Thimerosal       28 Melamine Formaldehyde Resin       29 Ethylenediamine Dihydrochloride        Parabens       30 Butyl-P-Hydroxybenzoate - -     31 Ethyl-P-Hydroxybenzoate - -     32 Methyl-P-Hydroxybenzoate - -     33 Propyl-P-Hydroxybenzoate - -    EMULSIFIERS & ADDITIVES       No Substance 2 days 4 days remarks    1 Polyethylene Glycol-400 NA NA     2 Cocamidopropyl Betaine - -     3 Amerchol L101 - -     4 Propylene Glycol - -     5 Triethanolamine - -     6 Sorbitane Sesquiolate - -     7 Isopropylmyristate - -     8 Polysorbate 80  - -     9 Amidoamine   (Stearamidopropyl Dimethylamine) NA NA     10 Oleamidopropyl Dimethylamine - -     11 Lauryl Glucoside NA NA     12 Coconut Diethanolamide  - -     13 2-Hydroxy-4-Methoxy Benzophenone (Oxybenzone) - -     14 Benzophenone-4 (Sulisobenzon) NA NA     15 Propolis - -     16 Dexpanthenol - -     17 Carboxymethyl Cellulose Sodium NA NA     18 Abitol - -     19 Tert-Butylhydroquinone - -     20 Benzyl Salicylate - -      Antioxidant       21 Dodecyl Gallate - -     22 Butylhydroxyanisole (BHA) - -     23 Butylhydroxytoluene (BHT) - -     24 Di-Alpha-Tocopherol (Vit E) - -     25 Propyl Gallate - -     26 Zinc Pyrithione NA NA     27 Dimethylaminopropylamin (DMAPA)      PERFUMES, FLAVORS & PLANTS        No Substance 2 days 4 days remarks    1 Benzyl  Cinnamate - -     2 Di-Limonene (Dipentene) - -     3 Cananga Odorata (Gonzalo Sánchez) (I) - -     4 Lichen Acid Mix - -     5 Mentha Piperita Oil (Peppermint Oil) - -     6 Sesquiterpenelactone mix - -     7 Tea Tree Oil, Oxidized - -     8 Wood Tar Mix - -     9 Abietic Acid - -     10 Lavendula Angustifolia Oil (Lavender Oil) - -     11 Camphor  NA NA      Fragrance Mix I       12 Oakmoss Absolute - -     13 Eugenol - -     14 Geraniol NA NA     15 Hydroxycitronellal - -     16 Isoeugenol - -     17 Cinnamic Aldehyde - -     18 Cinnamic Alcohol  - -      Fragrance mix II       19 Citronellol - -     20 Alpha-Hexylcinnamic Aldehyde    - -     21 Citral - -     22 Farnesol - -     23 Coumarin - -    HAIRDRESSER        No Substance 2 days 4 days remarks    1 P-Phenylenediamin  -  -     2 P-Toluenediamine Sulphate  -  -     3 Ammonium Thioglycolate - -     4 Ammonium Persulfate - -     5 Resorcinol - -     6 3-Aminophenol - -     7 P-Aminophenol - -     8 Glyceryl Monothioglycolate - -     9 Pyrogallol - -     10 P-Aminodiphenylamine NA NA    CORTICOSTEROIDS   No Substance 2 days 4 days remarks Allergy  class    1 Amcinonide - -  B    2 Betametasone-17,21 Dipropionate - -  D1    3 Desoximetasone - -  C    4 Betamethasone-17-Valerate - -  D1    5 Dexamethasone - -  C    6 Hydrocortisone - -  A    7 Clobetasol-17-Propionate - -  D1    8 Dexamethasone-21-Phosphate Disodium Salt - -  C    9 Hydrocortisone-17 Butyrate - -  D2    10 Prednisolone - -  A    11 Mometason Furoate NA NA  D1    12 Triamcinolone Acetonide - -  B    13 Methylprednisolone Aceponate NA NA  D2    14 Hydrocortisone-21-Acetate - -  A    15 Prednicarbate - -  D2     Group Characteristics of group Generic name Name  cross reactions   A Hydrocortisone   Cloprednole, Fludrocortisone acétate, Hydrocortison acetate, Methylprednisolone, Prednisolone, Tixocortolpivalate Alfacortone, Fucidin H, Dermacalm, Hexacortone, Premandole, Imacort With group D2   B  "Triamcinolone-acetonide   Budenoside (R-isomer), Amcinonide, Desonide, Fluocinolone acetonide, Triamcinolone acetonide Locapred, Locatop  Synalar, Pevisone, Kenacort -   C Betamethasone (Without Lianet)   Betamethasone, Dexamethasone, Flumethasone pivalate, Halomethasone Daivobet, Dexasalyl, Locasalen,   -   D1 Betamethasone-diproprionate   Betamethasone dipropionate, Betamethasone-17-valerate, Clobetasole-propionate, Fluticasone propionate, Mometasone furoate Betnovate, Diprogenta, Diprosalic, Diprosone, Celestoderm, Fucicort,  Cutivate, Axotide, Elocom -   D2 Methylprednisolone-aceponate   Hydrocortisone-aceponate, Hydrocortisone-buteprate, Hydrocortisone-17-butyrate, Methylprednisolone aceponate, Prednicarbate Locoïd, Advantan,  Prednitop With group A and Budesonide (S-isomer)     OTHER PRODUCTS        No Substance Conc  % solv 2 days 4 days remarks   153 1 Fluocinolone oil As is       154  155 2 Hydrogen Peroxide Salon care 20 30 par   154 as is  155 vaseline   156  157 3 fram color futura 30 par   156 as is  157 vaseline   158  159 4 Chiionic ammonia free color 30 par   158 as is  159 vaseline    5          6          7          8          9          10           Patients own products:  è DO NOT test if chemical or biological identity is unknown!   - always ask from patient the product information and safety sheets and consult with the physician   - check neutral pH with pH indicator paper (do not test pH below 5 or over 8 or consult with physician)  - leave-on cosmetics can be tested \"as is\"  - rinse-off products have to be diluted with water, buffer, olive oil or paraffin (discuss with physician)  à remember:   - non-specific toxic/corrosive or biological reactions can occur  - tests with patients own products are not standardized and test conditions are not optimized for patch tests. Whenever possible test with standardized test series and correlate use of product with the result of the patch tests  - if not " sure if compounds can be tested under occlusion in patch tests consider open application tests  Results of patch tests:                         Interpretation:  - Negative                    A    = Allergic      (+) Erythema    TI   = Toxic/irritant   + E + Infiltration    RaP = Relevance at Present     ++ E/I + Papulovesicle   Rpr  = Relevance Previously     +++ E/I/P + Blister     nR   = No Relevance    Atopy Screen (Placed 05/03/21 )    No Substance Readings (15 min) Evaluation   POS Histamine 1mg/ml ++    NEG NaCl 0.9% -      No Substance Readings (15 min) Evaluation   1 Alternaria alternata (tenuis)  -    2 Cladosporium herbarum -    3 Aspergillus fumigatus -    4 Penicillium notatum -    5 Dermatophagoides pteronyssinus -    6 Dermatophagoides farinae -    7 Dog epithelium (canis spp) -    8 Cat hair (stefany catus) -    9 Cockroach   (Blatella americana & germanica) -    10 Grass mix midwest   (Sarah, Orchard, Redtop, Yusuf) -    11 Paul grass (sorghum halepense) -    12 Weed mix   (common Cocklebur, Lamb s quarters, rough redroot Pigweed, Dock/Sorrel) -    13 Mug wort (artemisia vulgare) -    14 Ragweed giant/short (ambrosia spp) -    15 English Plantain (plantago lanceolata) -    16 Tree mix 1 (Pecan, Maple BHR, Oak RVW, american Diana, black [place]) ++    17 Red cedar (juniperus virginia) -    18 Tree mix 2   (white Baltazar, river/red Birch, black Buda, common Freedom, american Elm) -    19 Box elder/Maple mix (acer spp) -    20 Hooker shagbark (carya ovata) -           Conclusion: 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 Patient is atopic    Intradermal Testing (Placed 05/07/21??) maybe on Friday if patch tests negatives    No Substance Conc.  Readings (15min) Evaluation   - NaCl  0.9% -    + Histamine (prick) 0.1mg / ml -    DF Standard Dust Mite - D. Farinae 1:10 -    DP Standard Dust Mite - D. Pteronyssinus 1:10 -    A Aspergillus fumigatus  1:10 -    P Penicillium  notatum 1:10 -      Conclusion:     [] No relevant allergic reaction observed    [] Allergic reaction diagnosed against following allergens:      Interpretation/ remarks:   See later    [] Patient information given   [] ACDS CAMP information's (# ....) to following compounds: .....   [] General information's to following compounds: ......      Assessment & Plan:    ==> Final Diagnosis:     # Suspicion of contact allergy  * chronic with acute exacerbation    Minoxidil unlikely as she has taken oral minoxidil without problem. Could be reacting to additive vs a corticosteroid in hair solutions. She does have atopy as she has irritant reaction to wool.       These conclusions are made at the best of one's knowledge and belief based on the provided evidence such as patient's history and allergy test results and they can change over time or can be incomplete because of missing information's.    ==> Treatment Plan:  Advised to come in for testing and bring the fluocinolone oil with her.    Referred by: Dr. Sadaf Salgado     Procedures Performed:  Allergy tests, including prick and patch tests    Staff: provider    Follow-up in Derm-Allergy clinic for 1st readings of patch tests after 2 days (virtual) and 2nd readings and final conclusions after 4 days (in person)   ___________________________    I spent a total of 32 minutes with Susan J Kreye during today s  visit. This time was spent discussing all the individual test results, correlating them to the clinical relevance, counseling the patient and/or coordinating care and performing allergy tests such as prick and patch tests.

## 2021-05-03 ENCOUNTER — PRE VISIT (OUTPATIENT)
Dept: ALLERGY | Facility: CLINIC | Age: 71
End: 2021-05-03

## 2021-05-03 ENCOUNTER — OFFICE VISIT (OUTPATIENT)
Dept: ALLERGY | Facility: CLINIC | Age: 71
End: 2021-05-03
Attending: DERMATOLOGY
Payer: MEDICARE

## 2021-05-03 DIAGNOSIS — L20.89 OTHER ATOPIC DERMATITIS: ICD-10-CM

## 2021-05-03 DIAGNOSIS — L30.9 DERMATITIS: ICD-10-CM

## 2021-05-03 DIAGNOSIS — L23.89 ALLERGIC CONTACT DERMATITIS DUE TO OTHER AGENTS: Primary | ICD-10-CM

## 2021-05-03 DIAGNOSIS — J30.89 ALLERGIC RHINOCONJUNCTIVITIS, SEASONAL AND PERENNIAL: ICD-10-CM

## 2021-05-03 DIAGNOSIS — H10.10 ALLERGIC RHINOCONJUNCTIVITIS, SEASONAL AND PERENNIAL: ICD-10-CM

## 2021-05-03 DIAGNOSIS — J30.2 ALLERGIC RHINOCONJUNCTIVITIS, SEASONAL AND PERENNIAL: ICD-10-CM

## 2021-05-03 PROCEDURE — 95044 PATCH/APPLICATION TESTS: CPT | Performed by: DERMATOLOGY

## 2021-05-03 PROCEDURE — 95004 PERQ TESTS W/ALRGNC XTRCS: CPT | Performed by: DERMATOLOGY

## 2021-05-03 NOTE — LETTER
5/3/2021         RE: Susan J Kreye  [address]   [place] [address]        Dear Colleague,    Thank you for referring your patient, Susan J Kreye, to the [facility]. Please see a copy of my visit note below.    [facility] Dermato-allergology Note  Office visit  Encounter Date: May 3, 2021  ____________________________________________    CC: No chief complaint on file.      HPI:  Ms. Susan J Kreye is a(n) 71 year old female who presents today as a return patient for allergy consultation  - Follow-up in Derm-Allergy clinic for patch and prick testing as planned  - otherwise feeling well in usual state of health    Physical exam:  General: In no acute distress, well-developed, well-nourished  Eyes: no conjunctivitis  ENT: no signs of rhinitis   Pulmonary: no wheezing or coughing  Skin: Focused examination of the skin on test sites was performed = see test results below  No active skin lesions on test sites    Earlier History and Allergy exams:    - Has hair loss, has had rashes and reactions to various treatments.  - 1 liquid hair loss treatment was mixture of minoxidil and something else that she cannot remember, reacted to it last summer. Had rash around face and bumps on back and arms. 1/2 inch around hairline red and itchy. Seemed to be pretty quickly after applying. Red itchy rash resolved within a week of stopping. Bumps on back stayed.  - Fluocinolone oil - could feel itching as soon as she put it on. Similarly, 1/2 in around hair line red and itchy.  - was last summer on a solution of Minoxidil 0.5% and Finasterid 1.7% and reacted to it.    Past Medical History:   Patient Active Problem List   Diagnosis     Closed left ankle fracture     Family history of malignant neoplasm of ovary     Unspecified injury of muscle, fascia and tendon of right hip, sequela     Shoulder tendonitis, right     Abnormal EKG     Depression     Follow-up  examination following surgery     Glaucoma     Hearing loss in right ear     Osteopenia     Hyperlipidemia     Past Medical History:   Diagnosis Date     Depression      Glaucoma      Migraines      Osteoporosis      Osteoporosis        Allergy History:     Allergies   Allergen Reactions     Penicillins      Vaginal infections       Social History:   reports that she has never smoked. She has never used smokeless tobacco. She reports current alcohol use. She reports that she does not use drugs.      Family History:  Family History   Problem Relation Age of Onset     Cancer Mother 83        Ovarian     Diabetes Mother         DM II, controlled with diet     Prostate Cancer Mother      Gastrointestinal Disease Sister         Gallbladder     Cancer Father 60        Lung     Prostate Cancer Father      Cancer Maternal Grandmother         Bladder     Prostate Cancer Maternal Grandmother      C.A.D. Paternal Grandmother      Cerebrovascular Disease Paternal Grandfather 88     Prostate Cancer Maternal Grandfather        Medications:  Current Outpatient Medications   Medication Sig Dispense Refill     Biotin 10 MG TABS tablet Take 10,000 mcg by mouth daily       brimonidine-timolol (COMBIGAN) 0.2-0.5 % ophthalmic solution 1 drop 2 times daily.       Calcium Carbonate-Vitamin D (CALCIUM + D PO) Take 1,500 mg by mouth daily.       finasteride (PROPECIA) 1 MG tablet Take 1 mg by mouth       fluocinolone acetonide (DERMA SMOOTHE/FS BODY) 0.01 % external oil Apply topically At Bedtime (Patient not taking: Reported on 4/2/2021) 118 mL 5     minoxidil (LONITEN) 2.5 MG tablet Take 2.5 mg by mouth daily       Multiple Vitamin (MULTI-VITAMIN PO) Take 1 tablet by mouth daily.       Pyrithione Zinc (DHS ZINC) 2 % SHAM Externally apply topically daily 240 mL 5     sertraline (ZOLOFT) 50 MG tablet Take 75 mg by mouth daily.         Allergy tests:    Past Allergy tests None    Current Allergy tests (or planned)      Order for PATCH  TESTS    [x] Outpatient  [] Inpatient: Valerio..../ Bed ....      Skin Atopy (atopic dermatitis) [] Yes   [x] No  Contact allergies:   [x] Yes   [] No (allergic to wool, red rash, itchy bumps)  Urticaria/Angioedema  [] Yes   [x] No (in high school, had strep infection on legs)  Rhinitis/Sinusitis:   [x] Yes   [] No   [x] seasonal (early spring)  [] perennial            Allergic Asthma:   [] Yes   [x] No  Pets :  [] Yes   [x] No  Which?...... (Allergic to cats, itchy eyes, runny nose)  Food Allergy:   [x] Yes   [x] No (migraines from ricotta cheese, chocolate, cigarette smoke)  Drug allergies:    [] Yes   [x] No (yeast infections following penicillins)            Reason for tests (suspected allergy): suspected allergy  Known previous allergies: seasonal (early spring), cats, wool intolerance     Standardized panels  [x] Standard panel (40 tests)  [x] Preservatives & Antimicrobials (31 tests)  [x] Emulsifiers & Additives (25 tests)   [x] Perfumes/Flavours & Plants (25 tests)  [x] Hairdresser panel (12 tests)  [] Rubber Chemicals (22 tests)  [] Plastics (26 tests)  [] Colorants/Dyes/Food additives (20 tests)  [] Metals (implants/dental) (24 tests)  [] Local anaesthetics/NSAIDs (14 tests)  [] Antibiotics & Antimycotics (14 tests)   [x] Corticosteroids (15 tests)   [] Photopatch test (62 tests)   [] others: ...      [x] Patient's own products: fluocinolone oil solution and hair dye ...    DO NOT test if chemical or biological identity is unknown!     always ask from patient the product information and safety sheets (MSDS)       Order for PRICK TESTS    Standardized prick panels  [x] Atopic panel (20 tests)  - Pediatric Panel (12 tests)  - Milk, Meat, Eggs, Grains (20 tests)   - Dust, Epithelia, Feathers (10 tests)  - Fish, Seafood, Shellfish (17 tests)  - Nuts, Beans (14 tests)  - Spice, Vegetable, Fruit (17 tests)  - Others: ...      - Patient's own products: ...    DO NOT test if chemical or biological identity is  unknown!     always ask from patient the product information and safety sheets (MSDS)     Standardized intradermal tests  [x] Penicillium notatum [x] Aspergillus fumigatus [x] House dust mites  - Bee venom   - Wasp venom !!Specific protocol with dilutions!!  - Others: ...    RESULTS & EVALUATION of PATCH TESTS    Patch test readings after     [x] 2 days, [] 3 days [x] 4 days, [] 5 days,    May 3, 2021 application of patch tests with results:    STANDARD Series        No Substance 2 days 4 days remarks    1 Tacho Mix [C] - -     2 Colophony - -     3  2-Mercaptobenzothiazole  - -      4 Methylisothiazolinone - -     5 Carba Mix - -     6 Thiuram Mix [A] - -     7 Bisphenol A Epoxy Resin - -     8 E-Emjf-Gojdbajpdxc-Formaldehyde Resin - -     9 Mercapto Mix [A] - -     10 Black Rubber Mix- PPD [B] - -     11 Potassium Dichromate  -  -     12 Balsam of [place] (Myroxylon Pereirae Resin) - -     13 Nickel Sulphate Hexahydrate - -     14 Mixed Dialkyl Thiourea - -     15 Paraben Mix [B] - -     16 Methyldibromo Glutaronitrile - -     17 Fragrance Mix - -     18 2-Bromo-2-Nitropropane-1,3-Diol (Bronopol) NA NA     19 Lyral - -     20 Tixocortol-21- Pivalate - -     21 Diazolidiyl Urea (Germall II) - -     22 Methyl Methacrylate - -     23 Cobalt (II) Chloride Hexahydrate - -     24 Fragrance Mix II  - -     25 Compositae Mix - -     26 Benzoyl Peroxide - -     27 Bacitracin - -     28 Formaldehyde - -     29 Methylchloroisothiazolinone / Methylisothiazolinone - -     30 Corticosteroid Mix NA NA     31 Sodium Lauryl Sulfate - -     32 Lanolin Alcohol - -     33 Turpentine - -     34 Cetylstearylalcohol - -     35 Chlorhexidine Dicluconate - -     36 Budenoside - -     37 Imidazolidinyl Urea  - -     38 Ethyl-2 Cyanoacrylate NA NA     39 Quaternium 15 (Dowicil 200) - -     40 Decyl Glucoside - -    PRESERVATIVES & ANTIMICROBIALS        No Substance 2 days 4 days remarks    1  1,2-Benzisothiazoline-3-One, Sodium Salt NA NA      2  1,3,5-Shaun (2-Hydroxyethyl) - Hexahydrotriazine (Grotan BK) - -     3 4-Dnsivbnzkkrca-3-Nitro-1, 3-Propanediol - -     4  3, 4, 4' - Triclocarban - -     5 4 - Chloro - 3 - Cresol - -     6 4 - Chloro - 4 - Xylenol (PCMX) - -     7 7-Ethylbicyclooxazolidine (Bioban [id number]) - -     8 Benzalkonium Chloride NA NA     9 Benzyl Alcohol - -     10 Cetalkonium Chloride - -     11 Cetylpyrimidine Chloride  - -     12 Chloroacetamide - -     13 DMDM Hydantoin - -     14 Glutaraldehyde - -     15 Triclosan - -     16 Glyoxal Trimeric Dihydrate - -     17 Iodopropynyl Butylcarbamate - -     18 Octylisothiazoline - -     19 Iodoform NA NA     20 (Nitrobutyl) Morpholine/(Ethylnitro-Trimethylene) Dimorpholine (Bioban P 1487) - -     21 Phenoxyethanol - -     22 Phenyl Salicylate NA NA     23 Povidone Iodine - -     24 Sodium Benzoate - -     25 Sodium Disulfite - -     26 Sorbic Acid - -     27 Thimerosal       28 Melamine Formaldehyde Resin       29 Ethylenediamine Dihydrochloride        Parabens       30 Butyl-P-Hydroxybenzoate - -     31 Ethyl-P-Hydroxybenzoate - -     32 Methyl-P-Hydroxybenzoate - -     33 Propyl-P-Hydroxybenzoate - -    EMULSIFIERS & ADDITIVES       No Substance 2 days 4 days remarks    1 Polyethylene Glycol-400 NA NA     2 Cocamidopropyl Betaine - -     3 Amerchol L101 - -     4 Propylene Glycol - -     5 Triethanolamine - -     6 Sorbitane Sesquiolate - -     7 Isopropylmyristate - -     8 Polysorbate 80  - -     9 Amidoamine   (Stearamidopropyl Dimethylamine) NA NA     10 Oleamidopropyl Dimethylamine - -     11 Lauryl Glucoside NA NA     12 Coconut Diethanolamide  - -     13 2-Hydroxy-4-Methoxy Benzophenone (Oxybenzone) - -     14 Benzophenone-4 (Sulisobenzon) NA NA     15 Propolis - -     16 Dexpanthenol - -     17 Carboxymethyl Cellulose Sodium NA NA     18 Abitol - -     19 Tert-Butylhydroquinone - -     20 Benzyl Salicylate - -      Antioxidant       21 Dodecyl Gallate - -     22  Butylhydroxyanisole (BHA) - -     23 Butylhydroxytoluene (BHT) - -     24 Di-Alpha-Tocopherol (Vit E) - -     25 Propyl Gallate - -     26 Zinc Pyrithione NA NA     27 Dimethylaminopropylamin (DMAPA)      PERFUMES, FLAVORS & PLANTS        No Substance 2 days 4 days remarks    1 Benzyl Cinnamate - -     2 Di-Limonene (Dipentene) - -     3 Cananga Odorata (Gonzalo Sánchez) (I) - -     4 Lichen Acid Mix - -     5 Mentha Piperita Oil (Peppermint Oil) - -     6 Sesquiterpenelactone mix - -     7 Tea Tree Oil, Oxidized - -     8 Wood Tar Mix - -     9 Abietic Acid - -     10 Lavendula Angustifolia Oil (Lavender Oil) - -     11 Camphor  NA NA      Fragrance Mix I       12 Oakmoss Absolute - -     13 Eugenol - -     14 Geraniol NA NA     15 Hydroxycitronellal - -     16 Isoeugenol - -     17 Cinnamic Aldehyde - -     18 Cinnamic Alcohol  - -      Fragrance mix II       19 Citronellol - -     20 Alpha-Hexylcinnamic Aldehyde    - -     21 Citral - -     22 Farnesol - -     23 Coumarin - -    HAIRDRESSER        No Substance 2 days 4 days remarks    1 P-Phenylenediamin  -  -     2 P-Toluenediamine Sulphate  -  -     3 Ammonium Thioglycolate - -     4 Ammonium Persulfate - -     5 Resorcinol - -     6 3-Aminophenol - -     7 P-Aminophenol - -     8 Glyceryl Monothioglycolate - -     9 Pyrogallol - -     10 P-Aminodiphenylamine NA NA    CORTICOSTEROIDS   No Substance 2 days 4 days remarks Allergy  class    1 Amcinonide - -  B    2 Betametasone-17,21 Dipropionate - -  D1    3 Desoximetasone - -  C    4 Betamethasone-17-Valerate - -  D1    5 Dexamethasone - -  C    6 Hydrocortisone - -  A    7 Clobetasol-17-Propionate - -  D1    8 Dexamethasone-21-Phosphate Disodium Salt - -  C    9 Hydrocortisone-17 Butyrate - -  D2    10 Prednisolone - -  A    11 Mometason Furoate NA NA  D1    12 Triamcinolone Acetonide - -  B    13 Methylprednisolone Aceponate NA NA  D2    14 Hydrocortisone-21-Acetate - -  A    15 Prednicarbate - -  D2     Group  "Characteristics of group Generic name Name  cross reactions   A Hydrocortisone   Cloprednole, Fludrocortisone acétate, Hydrocortison acetate, Methylprednisolone, Prednisolone, Tixocortolpivalate Alfacortone, Fucidin H, Dermacalm, Hexacortone, Premandole, Imacort With group D2   B Triamcinolone-acetonide   Budenoside (R-isomer), Amcinonide, Desonide, Fluocinolone acetonide, Triamcinolone acetonide Locapred, Locatop  Synalar, Pevisone, Kenacort -   C Betamethasone (Without Lianet)   Betamethasone, Dexamethasone, Flumethasone pivalate, Halomethasone Daivobet, Dexasalyl, Locasalen,   -   D1 Betamethasone-diproprionate   Betamethasone dipropionate, Betamethasone-17-valerate, Clobetasole-propionate, Fluticasone propionate, Mometasone furoate Betnovate, Diprogenta, Diprosalic, Diprosone, Celestoderm, Fucicort,  Cutivate, Axotide, Elocom -   D2 Methylprednisolone-aceponate   Hydrocortisone-aceponate, Hydrocortisone-buteprate, Hydrocortisone-17-butyrate, Methylprednisolone aceponate, Prednicarbate Locoïd, Advantan,  Prednitop With group A and Budesonide (S-isomer)     OTHER PRODUCTS        No Substance Conc  % solv 2 days 4 days remarks   153 1 Fluocinolone oil As is       154  155 2 Hydrogen Peroxide Salon care 20 30 par   154 as is  155 vaseline   156  157 3 fram color futura 30 par   156 as is  157 vaseline   158  159 4 Chiionic ammonia free color 30 par   158 as is  159 vaseline    5          6          7          8          9          10           Patients own products:  è DO NOT test if chemical or biological identity is unknown!   - always ask from patient the product information and safety sheets and consult with the physician   - check neutral pH with pH indicator paper (do not test pH below 5 or over 8 or consult with physician)  - leave-on cosmetics can be tested \"as is\"  - rinse-off products have to be diluted with water, buffer, olive oil or paraffin (discuss with physician)  à remember:   - non-specific " toxic/corrosive or biological reactions can occur  - tests with patients own products are not standardized and test conditions are not optimized for patch tests. Whenever possible test with standardized test series and correlate use of product with the result of the patch tests  - if not sure if compounds can be tested under occlusion in patch tests consider open application tests  Results of patch tests:                         Interpretation:  - Negative                    A    = Allergic      (+) Erythema    TI   = Toxic/irritant   + E + Infiltration    RaP = Relevance at Present     ++ E/I + Papulovesicle   Rpr  = Relevance Previously     +++ E/I/P + Blister     nR   = No Relevance    Atopy Screen (Placed 05/03/21 )    No Substance Readings (15 min) Evaluation   POS Histamine 1mg/ml ++    NEG NaCl 0.9% -      No Substance Readings (15 min) Evaluation   1 Alternaria alternata (tenuis)  -    2 Cladosporium herbarum -    3 Aspergillus fumigatus -    4 Penicillium notatum -    5 Dermatophagoides pteronyssinus -    6 Dermatophagoides farinae -    7 Dog epithelium (canis spp) -    8 Cat hair (stefany catus) -    9 Cockroach   (Blatella americana & germanica) -    10 Grass mix midwest   (Sarah, Orchard, Redtop, Yusuf) -    11 Paul grass (sorghum halepense) -    12 Weed mix   (common Cocklebur, Lamb s quarters, rough redroot Pigweed, Dock/Sorrel) -    13 Mug wort (artemisia vulgare) -    14 Ragweed giant/short (ambrosia spp) -    15 English Plantain (plantago lanceolata) -    16 Tree mix 1 (Pecan, Maple BHR, Oak RVW, american [place], black [place]an) ++    17 Red cedar (juniperus virginia) -    18 Tree mix 2   (white Baltazar, river/red Birch, black [place], common [place], american Elm) -    19 Box elder/Maple mix (acer spp) -    20 Chaplin shagbark (carya ovata) -           Conclusion: 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 Patient is atopic    Intradermal Testing (Placed 05/07/21??)  maybe on Friday if patch tests negatives    No Substance Conc.  Readings (15min) Evaluation   - NaCl  0.9% -    + Histamine (prick) 0.1mg / ml -    DF Standard Dust Mite - D. Farinae 1:10 -    DP Standard Dust Mite - D. Pteronyssinus 1:10 -    A Aspergillus fumigatus  1:10 -    P Penicillium notatum 1:10 -      Conclusion:     [] No relevant allergic reaction observed    [] Allergic reaction diagnosed against following allergens:      Interpretation/ remarks:   See later    [] Patient information given   [] ACDS CAMP information's (# ....) to following compounds: .....   [] General information's to following compounds: ......      Assessment & Plan:    ==> Final Diagnosis:     # Suspicion of contact allergy  * chronic with acute exacerbation    Minoxidil unlikely as she has taken oral minoxidil without problem. Could be reacting to additive vs a corticosteroid in hair solutions. She does have atopy as she has irritant reaction to wool.       These conclusions are made at the best of one's knowledge and belief based on the provided evidence such as patient's history and allergy test results and they can change over time or can be incomplete because of missing information's.    ==> Treatment Plan:  Advised to come in for testing and bring the fluocinolone oil with her.    Referred by: Dr. Sadaf Salgado     Procedures Performed:  Allergy tests, including prick and patch tests    Staff: provider    Follow-up in Derm-Allergy clinic for 1st readings of patch tests after 2 days (virtual) and 2nd readings and final conclusions after 4 days (in person)   ___________________________    I spent a total of 32 minutes with Susan J Kreye during today s  visit. This time was spent discussing all the individual test results, correlating them to the clinical relevance, counseling the patient and/or coordinating care and performing allergy tests such as prick and patch tests.       Again, thank you for allowing me to participate in  the care of your patient.        Sincerely,        Brian Harris MD

## 2021-05-03 NOTE — NURSING NOTE
Dermatology Rooming Note    Susan J Kreye's goals for this visit include:   Chief Complaint   Patient presents with     Allergy Recheck     Patch testing day 1     Ana Bernardo CMA

## 2021-05-03 NOTE — PROGRESS NOTES
HairMetrix Summary (3/9/2021)  Patient presents for hair metrix examination as noted below.  Sadaf Salgado MD    Frontal anterior    Mid scalp    Vertex    Occipital    Right temporal    Left temporal

## 2021-05-03 NOTE — PATIENT INSTRUCTIONS
Removal of Patch Tests on Day 3:    1. Remove patches and tape from one test area (one rectangle)          2. Using the purple surgical markers provided (or other permanent marker), draw a grid around the test area so that the circular indentation is in the center of each square, as below. Try to be as neat as possible and keep the lines as straight as you can (you can use a ruler if you need to)          3. Redraw the number that was underneath the tape above the grids, as shown below. Try to print clearly.          4. Repeat the process for the remaining test areas.          5. Photograph the entire area then take close-up photos of any possible reactions. Examples of possible reactions are spots that look like these:          6. Return to clinic for evaluation as instructed. You should continue to avoid getting the area wet (no showering or strenuous exercise), exposing the area to UV light, using topical steroids, or scratching.         Who should I call with questions?    [facility], [place]: [phone]    For urgent needs outside of business hours call the [facility] at [phone] and ask for the dermatology resident on call      If you develop any serious or adverse allergic reaction after office hours please seek immediate medical assistance at the nearest clinic or emergency room

## 2021-05-05 ENCOUNTER — VIRTUAL VISIT (OUTPATIENT)
Dept: ALLERGY | Facility: CLINIC | Age: 71
End: 2021-05-05
Payer: MEDICARE

## 2021-05-05 DIAGNOSIS — L20.89 OTHER ATOPIC DERMATITIS: ICD-10-CM

## 2021-05-05 DIAGNOSIS — L23.89 ALLERGIC CONTACT DERMATITIS DUE TO OTHER AGENTS: Primary | ICD-10-CM

## 2021-05-05 PROCEDURE — 99207 PR NO CHARGE LOS: CPT | Mod: 95 | Performed by: DERMATOLOGY

## 2021-05-05 NOTE — NURSING NOTE
Dermatology Rooming Note    Susan J Kreye's goals for this visit include:   Chief Complaint   Patient presents with     Allergy Recheck     patch testing day 3     Ana eBrnardo CMA

## 2021-05-05 NOTE — PROGRESS NOTES
[facility] Dermato-allergology Note  Office visit  Encounter Date: May 7, 2021  ____________________________________________    CC: No chief complaint on file.      HPI:  Ms. Susan J Kreye is a(n) 71 year old female who presents today as a return patient for allergy consultation  - Follow-up in Derm-Allergy clinic for  2nd readings and final conclusions after 4 days  - otherwise feeling well in usual state of health    Physical exam:  General: In no acute distress, well-developed, well-nourished  Eyes: no conjunctivitis  ENT: no signs of rhinitis   Pulmonary: no wheezing or coughing  Skin:Focused examination of the skin on test sites was performed = see test results below    Earlier History and Allergy exams:    - Has hair loss, has had rashes and reactions to various treatments.  - 1 liquid hair loss treatment was mixture of minoxidil and something else that she cannot remember, reacted to it last summer. Had rash around face and bumps on back and arms. 1/2 inch around hairline red and itchy. Seemed to be pretty quickly after applying. Red itchy rash resolved within a week of stopping. Bumps on back stayed.  - Fluocinolone oil - could feel itching as soon as she put it on. Similarly, 1/2 in around hair line red and itchy.  - was last summer on a solution of Minoxidil 0.5% and Finasterid 1.7% and reacted to it.    Past Medical History:   Patient Active Problem List   Diagnosis     Closed left ankle fracture     Family history of malignant neoplasm of ovary     Unspecified injury of muscle, fascia and tendon of right hip, sequela     Shoulder tendonitis, right     Abnormal EKG     Depression     Follow-up examination following surgery     Glaucoma     Hearing loss in right ear     Osteopenia     Hyperlipidemia     Past Medical History:   Diagnosis Date     Depression      Glaucoma      Migraines      Osteoporosis      Osteoporosis        Allergy History:     Allergies   Allergen Reactions      Penicillins      Vaginal infections       Social History:   reports that she has never smoked. She has never used smokeless tobacco. She reports current alcohol use. She reports that she does not use drugs.      Family History:  Family History   Problem Relation Age of Onset     Cancer Mother 83        Ovarian     Diabetes Mother         DM II, controlled with diet     Prostate Cancer Mother      Gastrointestinal Disease Sister         Gallbladder     Cancer Father 60        Lung     Prostate Cancer Father      Cancer Maternal Grandmother         Bladder     Prostate Cancer Maternal Grandmother      C.A.D. Paternal Grandmother      Cerebrovascular Disease Paternal Grandfather 88     Prostate Cancer Maternal Grandfather        Medications:  Current Outpatient Medications   Medication Sig Dispense Refill     Biotin 10 MG TABS tablet Take 10,000 mcg by mouth daily       brimonidine-timolol (COMBIGAN) 0.2-0.5 % ophthalmic solution 1 drop 2 times daily.       Calcium Carbonate-Vitamin D (CALCIUM + D PO) Take 1,500 mg by mouth daily.       finasteride (PROPECIA) 1 MG tablet Take 1 mg by mouth       fluocinolone acetonide (DERMA SMOOTHE/FS BODY) 0.01 % external oil Apply topically At Bedtime (Patient not taking: Reported on 4/2/2021) 118 mL 5     minoxidil (LONITEN) 2.5 MG tablet Take 2.5 mg by mouth daily       Multiple Vitamin (MULTI-VITAMIN PO) Take 1 tablet by mouth daily.       Pyrithione Zinc (DHS ZINC) 2 % SHAM Externally apply topically daily 240 mL 5     sertraline (ZOLOFT) 50 MG tablet Take 75 mg by mouth daily.         Allergy tests:    Past Allergy tests None    Current Allergy tests (or planned)      Order for PATCH TESTS    [x] Outpatient  [] Inpatient: Valerio..../ Bed ....      Skin Atopy (atopic dermatitis) [] Yes   [x] No  Contact allergies:   [x] Yes   [] No (allergic to wool, red rash, itchy bumps)  Urticaria/Angioedema  [] Yes   [x] No (in high school, had strep infection on  legs)  Rhinitis/Sinusitis:   [x] Yes   [] No   [x] seasonal (early spring)  [] perennial            Allergic Asthma:   [] Yes   [x] No  Pets :  [] Yes   [x] No  Which?...... (Allergic to cats, itchy eyes, runny nose)  Food Allergy:   [x] Yes   [x] No (migraines from ricotta cheese, chocolate, cigarette smoke)  Drug allergies:    [] Yes   [x] No (yeast infections following penicillins)            Reason for tests (suspected allergy): suspected allergy  Known previous allergies: seasonal (early spring), cats, wool intolerance     Standardized panels  [x] Standard panel (40 tests)  [x] Preservatives & Antimicrobials (31 tests)  [x] Emulsifiers & Additives (25 tests)   [x] Perfumes/Flavours & Plants (25 tests)  [x] Hairdresser panel (12 tests)  [] Rubber Chemicals (22 tests)  [] Plastics (26 tests)  [] Colorants/Dyes/Food additives (20 tests)  [] Metals (implants/dental) (24 tests)  [] Local anaesthetics/NSAIDs (14 tests)  [] Antibiotics & Antimycotics (14 tests)   [x] Corticosteroids (15 tests)   [] Photopatch test (62 tests)   [] others: ...      [x] Patient's own products: fluocinolone oil solution and hair dye ...    DO NOT test if chemical or biological identity is unknown!     always ask from patient the product information and safety sheets (MSDS)       Order for PRICK TESTS    Standardized prick panels  [x] Atopic panel (20 tests)  - Pediatric Panel (12 tests)  - Milk, Meat, Eggs, Grains (20 tests)   - Dust, Epithelia, Feathers (10 tests)  - Fish, Seafood, Shellfish (17 tests)  - Nuts, Beans (14 tests)  - Spice, Vegetable, Fruit (17 tests)  - Others: ...      - Patient's own products: ...    DO NOT test if chemical or biological identity is unknown!     always ask from patient the product information and safety sheets (MSDS)     Standardized intradermal tests  [x] Penicillium notatum [x] Aspergillus fumigatus [x] House dust mites  - Bee venom   - Wasp venom !!Specific protocol with dilutions!!  - Others:  ...    RESULTS & EVALUATION of PATCH TESTS    Patch test readings after     [x] 2 days, [] 3 days [x] 4 days, [] 5 days,    May 7, 2021 application of patch tests with results:    STANDARD Series        No Substance 2 days 4 days remarks    1 Tacho Mix [C] - -     2 Colophony - -     3  2-Mercaptobenzothiazole  - -      4 Methylisothiazolinone + +/++     5 Carba Mix - -     6 Thiuram Mix [A] - -     7 Bisphenol A Epoxy Resin - -     8 M-Htgj-Esthhnbqqcn-Formaldehyde Resin - -     9 Mercapto Mix [A] - -     10 Black Rubber Mix- PPD [B] - -     11 Potassium Dichromate  -  -     12 Balsam of [place] (Myroxylon Pereirae Resin) + -     13 Nickel Sulphate Hexahydrate - -     14 Mixed Dialkyl Thiourea - -     15 Paraben Mix [B] - -     16 Methyldibromo Glutaronitrile - -     17 Fragrance Mix - -     18 2-Bromo-2-Nitropropane-1,3-Diol (Bronopol) NA NA     19 Lyral - -     20 Tixocortol-21- Pivalate - -     21 Diazolidiyl Urea (Germall II) - -     22 Methyl Methacrylate - -     23 Cobalt (II) Chloride Hexahydrate - -     24 Fragrance Mix II  - -     25 Compositae Mix - -     26 Benzoyl Peroxide - -     27 Bacitracin - -     28 Formaldehyde - -     29 Methylchloroisothiazolinone / Methylisothiazolinone - +     30 Corticosteroid Mix NA NA     31 Sodium Lauryl Sulfate - -     32 Lanolin Alcohol - -     33 Turpentine - -     34 Cetylstearylalcohol - -     35 Chlorhexidine Dicluconate - -     36 Budenoside - -     37 Imidazolidinyl Urea  - -     38 Ethyl-2 Cyanoacrylate NA NA     39 Quaternium 15 (Dowicil 200) - -     40 Decyl Glucoside - -    PRESERVATIVES & ANTIMICROBIALS        No Substance 2 days 4 days remarks   41 1  1,2-Benzisothiazoline-3-One, Sodium Salt NA NA     2  1,3,5-Shaun (2-Hydroxyethyl) - Hexahydrotriazine (Grotan BK) - -     3 4-Emxbtwbimhsjg-6-Nitro-1, 3-Propanediol - -     4  3, 4, 4' - Triclocarban - -    45 5 4 - Chloro - 3 - Cresol - -     6 4 - Chloro - 4 - Xylenol (PCMX) - -     7 7-Ethylbicyclooxazolidine  (Bioban IJ0656) - -     8 Benzalkonium Chloride NA NA     9 Benzyl Alcohol - -    50 10 Cetalkonium Chloride - -     11 Cetylpyrimidine Chloride  - -     12 Chloroacetamide - -     13 DMDM Hydantoin - -     14 Glutaraldehyde - -    55 15 Triclosan - -     16 Glyoxal Trimeric Dihydrate - -     17 Iodopropynyl Butylcarbamate - -     18 Octylisothiazoline - -     19 Iodoform NA NA    60 20 (Nitrobutyl) Morpholine/(Ethylnitro-Trimethylene) Dimorpholine (Bioban P 1487) - -     21 Phenoxyethanol - -     22 Phenyl Salicylate NA NA     23 Povidone Iodine - -     24 Sodium Benzoate - -    65 25 Sodium Disulfite - -     26 Sorbic Acid - -     27 Thimerosal       28 Melamine Formaldehyde Resin       29 Ethylenediamine Dihydrochloride        Parabens      70 30 Butyl-P-Hydroxybenzoate - -     31 Ethyl-P-Hydroxybenzoate - -     32 Methyl-P-Hydroxybenzoate - -    73 33 Propyl-P-Hydroxybenzoate - -    EMULSIFIERS & ADDITIVES       No Substance 2 days 4 days remarks   74 1 Polyethylene Glycol-400 NA NA    75 2 Cocamidopropyl Betaine - -     3 Amerchol L101 - -     4 Propylene Glycol - -     5 Triethanolamine - -     6 Sorbitane Sesquiolate - -    80 7 Isopropylmyristate - -     8 Polysorbate 80  - -     9 Amidoamine   (Stearamidopropyl Dimethylamine) NA NA     10 Oleamidopropyl Dimethylamine - -     11 Lauryl Glucoside NA NA    85 12 Coconut Diethanolamide  - -     13 2-Hydroxy-4-Methoxy Benzophenone (Oxybenzone) - -     14 Benzophenone-4 (Sulisobenzon) NA NA     15 Propolis - -     16 Dexpanthenol - -    90 17 Carboxymethyl Cellulose Sodium NA NA     18 Abitol - -     19 Tert-Butylhydroquinone - -     20 Benzyl Salicylate - -      Antioxidant       21 Dodecyl Gallate - -    95 22 Butylhydroxyanisole (BHA) - -     23 Butylhydroxytoluene (BHT) - -     24 Di-Alpha-Tocopherol (Vit E) - -     25 Propyl Gallate - -     26 Zinc Pyrithione NA NA    100 27 Dimethylaminopropylamin (DMAPA)      PERFUMES, FLAVORS & PLANTS        No Substance  2 days 4 days remarks   101 1 Benzyl Cinnamate - -     2 Di-Limonene (Dipentene) - -     3 Cananga Odorata (Gonzalo Sánchez) (I) - -     4 Lichen Acid Mix - -    105 5 Mentha Piperita Oil (Peppermint Oil) - -     6 Sesquiterpenelactone mix - -     7 Tea Tree Oil, Oxidized - -     8 Wood Tar Mix - -     9 Abietic Acid - -    110 10 Lavendula Angustifolia Oil (Lavender Oil) - -     11 Camphor  NA NA      Fragrance Mix I       12 Oakmoss Absolute - -     13 Eugenol - -     14 Geraniol NA NA    115 15 Hydroxycitronellal - -     16 Isoeugenol - -     17 Cinnamic Aldehyde - -     18 Cinnamic Alcohol  - -      Fragrance mix II       19 Citronellol - -    120 20 Alpha-Hexylcinnamic Aldehyde    - -     21 Citral - -     22 Farnesol - -    123 23 Coumarin - -    HAIRDRESSER        No Substance 2 days 4 days remarks   124 1 P-Phenylenediamin  +++  +++    125 2 P-Toluenediamine Sulphate  -  -     3 Ammonium Thioglycolate - -     4 Ammonium Persulfate - -     5 Resorcinol - -     6 3-Aminophenol - -    130 7 P-Aminophenol - -     8 Glyceryl Monothioglycolate - -     9 Pyrogallol - -    133 10 P-Aminodiphenylamine NA NA    CORTICOSTEROIDS   No Substance 2 days 4 days remarks Allergy  class   134 1 Amcinonide - -  B   135 2 Betametasone-17,21 Dipropionate - -  D1    3 Desoximetasone - -  C    4 Betamethasone-17-Valerate - -  D1    5 Dexamethasone - -  C    6 Hydrocortisone - -  A   140 7 Clobetasol-17-Propionate - -  D1    8 Dexamethasone-21-Phosphate Disodium Salt - -  C    9 Hydrocortisone-17 Butyrate - -  D2    10 Prednisolone - -  A    11 Mometason Furoate NA NA  D1   145 12 Triamcinolone Acetonide - -  B    13 Methylprednisolone Aceponate NA NA  D2    14 Hydrocortisone-21-Acetate - -  A   148 15 Prednicarbate - -  D2     Group Characteristics of group Generic name Name  cross reactions   A Hydrocortisone   Cloprednole, Fludrocortisone acétate, Hydrocortison acetate, Methylprednisolone, Prednisolone, Tixocortolpivalate  Alfacortone, Fucidin H, Dermacalm, Hexacortone, Premandole, Imacort With group D2   B Triamcinolone-acetonide   Budenoside (R-isomer), Amcinonide, Desonide, Fluocinolone acetonide, Triamcinolone acetonide Locapred, Locatop  Synalar, Pevisone, Kenacort -   C Betamethasone (Without Lianet)   Betamethasone, Dexamethasone, Flumethasone pivalate, Halomethasone Daivobet, Dexasalyl, Locasalen,   -   D1 Betamethasone-diproprionate   Betamethasone dipropionate, Betamethasone-17-valerate, Clobetasole-propionate, Fluticasone propionate, Mometasone furoate Betnovate, Diprogenta, Diprosalic, Diprosone, Celestoderm, Fucicort,  Cutivate, Axotide, Elocom -   D2 Methylprednisolone-aceponate   Hydrocortisone-aceponate, Hydrocortisone-buteprate, Hydrocortisone-17-butyrate, Methylprednisolone aceponate, Prednicarbate Locoïd, Advantan,  Prednitop With group A and Budesonide (S-isomer)     OTHER PRODUCTS        No Substance Conc  % solv 2 days 4 days remarks   153 1 Fluocinolone oil As is  - -    154  155 2 Hydrogen Peroxide Salon care 20 30 par - - 154 as is  155 vaseline   156  157 3 fram color futura 30 par ++/++ ++/++ 156 as is  157 vaseline   158  159 4 Chiionic ammonia free color 30 par ++/++ ++/++ 158 as is  159 vaseline     Results of patch tests:                         Interpretation:  - Negative                    A    = Allergic      (+) Erythema    TI   = Toxic/irritant   + E + Infiltration    RaP = Relevance at Present     ++ E/I + Papulovesicle   Rpr  = Relevance Previously     +++ E/I/P + Blister     nR   = No Relevance    Atopy Screen (Placed 05/03/21 )    No Substance Readings (15 min) Evaluation   POS Histamine 1mg/ml ++    NEG NaCl 0.9% -      No Substance Readings (15 min) Evaluation   1 Alternaria alternata (tenuis)  -    2 Cladosporium herbarum -    3 Aspergillus fumigatus -    4 Penicillium notatum -    5 Dermatophagoides pteronyssinus -    6 Dermatophagoides farinae -    7 Dog epithelium (canis spp) -    8 Cat  hair (stefany catus) -    9 Cockroach   (Blatella americana & germanica) -    10 Grass mix midwest   (Sarah, Orchard, Redtop, Yusuf) -    11 Paul grass (sorghum halepense) -    12 Weed mix   (common Cocklebur, Lamb s quarters, rough redroot Pigweed, Dock/Sorrel) -    13 Mug wort (artemisia vulgare) -    14 Ragweed giant/short (ambrosia spp) -    15 English Plantain (plantago lanceolata) -    16 Tree mix 1 (Pecan, Maple BHR, Oak RVW, american [place], black [place]) ++    17 Red cedar (juniperus virginia) -    18 Tree mix 2   (white [place], river/red Birch, black [place], common Roane, american Elm) -    19 Box elder/Maple mix (acer spp) -    20 Windham shagbark (carya ovata) -           Conclusion: 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 Patient is atopic    [] No relevant allergic reaction observed    [x] Allergic reaction diagnosed against following allergens:   +++ PPD and the patients own hair dyes  +/++ Methylisothiazolinone      Interpretation/ remarks:   Patient has a severe allergy to PPD and has to avoid in future all hair dyes containing PPD.   In addition, patient has an allergy to the preservative Methylisothazolinone, that usually replaces the Parabens (patient has NO allergy to Parabens)    [x] Patient information given   [x] ACDS CAMP information's (# [id number], and [id number]) to following  compounds: PPD and Methylisothiazolinone   [] General information's to following compounds: ......      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    ==> Treatment Plan:  >> stop using hair dyes containing  PPD  >> avoid products containing Methylisothiazolinone = follow the CAMP [facility] recommendations    Referred by: Dr. Sadaf Salgado    Staff: provider    Follow-up in Derm-Allergy clinic if necessary, maybe for PPD trial  ___________________________    I spent a total of 34 minutes face to face with Susan J Kreye during today s office visit. Over 50% of this time was spent reading the patch tests, discussing all the test results, correlating them to the clinical relevance, counseling the patient and/or coordinating care. Moreover time was spent to install and explain the CAMP Diego. Please see Assessment and Plan for details.

## 2021-05-05 NOTE — LETTER
5/5/2021         RE: Susan J Kreye  [address]   [place] [address]        Dear Colleague,    Thank you for referring your patient, Susan J Kreye, to the [facility] [place]. Please see a copy of my visit note below.    [facility] Dermato-allergology Note  Virtual visit: store and forward video --> patient had problems with upload of pictures and could not start a video call. Will come during this morning in person for 1st reading of patch tests  Encounter Date: May 5, 2021  ____________________________________________    CC: No chief complaint on file.      HPI:  Ms. Susan J Kreye is a(n) 71 year old female who presents today as a return patient for allergy consultation  - Follow-up in Derm-Allergy clinic for 1st readings of patch tests after 2 days (virtual)   - otherwise feeling well in usual state of health    Physical exam:  General: In no acute distress, well-developed, well-nourished  Eyes: no conjunctivitis  ENT: no signs of rhinitis   Pulmonary: no wheezing or coughing  Skin:Focused examination of the skin on test sites was performed = see test results below    Earlier History and Allergy exams:    - Has hair loss, has had rashes and reactions to various treatments.  - 1 liquid hair loss treatment was mixture of minoxidil and something else that she cannot remember, reacted to it last summer. Had rash around face and bumps on back and arms. 1/2 inch around hairline red and itchy. Seemed to be pretty quickly after applying. Red itchy rash resolved within a week of stopping. Bumps on back stayed.  - Fluocinolone oil - could feel itching as soon as she put it on. Similarly, 1/2 in around hair line red and itchy.  - was last summer on a solution of Minoxidil 0.5% and Finasterid 1.7% and reacted to it.    Past Medical History:   Patient Active Problem List   Diagnosis     Closed left ankle fracture     Family history of malignant neoplasm of ovary      Unspecified injury of muscle, fascia and tendon of right hip, sequela     Shoulder tendonitis, right     Abnormal EKG     Depression     Follow-up examination following surgery     Glaucoma     Hearing loss in right ear     Osteopenia     Hyperlipidemia     Past Medical History:   Diagnosis Date     Depression      Glaucoma      Migraines      Osteoporosis      Osteoporosis        Allergy History:     Allergies   Allergen Reactions     Penicillins      Vaginal infections       Social History:   reports that she has never smoked. She has never used smokeless tobacco. She reports current alcohol use. She reports that she does not use drugs.      Family History:  Family History   Problem Relation Age of Onset     Cancer Mother 83        Ovarian     Diabetes Mother         DM II, controlled with diet     Prostate Cancer Mother      Gastrointestinal Disease Sister         Gallbladder     Cancer Father 60        Lung     Prostate Cancer Father      Cancer Maternal Grandmother         Bladder     Prostate Cancer Maternal Grandmother      C.A.D. Paternal Grandmother      Cerebrovascular Disease Paternal Grandfather 88     Prostate Cancer Maternal Grandfather        Medications:  Current Outpatient Medications   Medication Sig Dispense Refill     Biotin 10 MG TABS tablet Take 10,000 mcg by mouth daily       brimonidine-timolol (COMBIGAN) 0.2-0.5 % ophthalmic solution 1 drop 2 times daily.       Calcium Carbonate-Vitamin D (CALCIUM + D PO) Take 1,500 mg by mouth daily.       finasteride (PROPECIA) 1 MG tablet Take 1 mg by mouth       fluocinolone acetonide (DERMA SMOOTHE/FS BODY) 0.01 % external oil Apply topically At Bedtime (Patient not taking: Reported on 4/2/2021) 118 mL 5     minoxidil (LONITEN) 2.5 MG tablet Take 2.5 mg by mouth daily       Multiple Vitamin (MULTI-VITAMIN PO) Take 1 tablet by mouth daily.       Pyrithione Zinc (DHS ZINC) 2 % SHAM Externally apply topically daily 240 mL 5     sertraline (ZOLOFT) 50 MG  tablet Take 75 mg by mouth daily.         Allergy tests:    Past Allergy tests None    Current Allergy tests (or planned)      Order for PATCH TESTS    [x] Outpatient  [] Inpatient: Valerio..../ Bed ....      Skin Atopy (atopic dermatitis) [] Yes   [x] No  Contact allergies:   [x] Yes   [] No (allergic to wool, red rash, itchy bumps)  Urticaria/Angioedema  [] Yes   [x] No (in high school, had strep infection on legs)  Rhinitis/Sinusitis:   [x] Yes   [] No   [x] seasonal (early spring)  [] perennial            Allergic Asthma:   [] Yes   [x] No  Pets :  [] Yes   [x] No  Which?...... (Allergic to cats, itchy eyes, runny nose)  Food Allergy:   [x] Yes   [x] No (migraines from ricotta cheese, chocolate, cigarette smoke)  Drug allergies:    [] Yes   [x] No (yeast infections following penicillins)            Reason for tests (suspected allergy): suspected allergy  Known previous allergies: seasonal (early spring), cats, wool intolerance     Standardized panels  [x] Standard panel (40 tests)  [x] Preservatives & Antimicrobials (31 tests)  [x] Emulsifiers & Additives (25 tests)   [x] Perfumes/Flavours & Plants (25 tests)  [x] Hairdresser panel (12 tests)  [] Rubber Chemicals (22 tests)  [] Plastics (26 tests)  [] Colorants/Dyes/Food additives (20 tests)  [] Metals (implants/dental) (24 tests)  [] Local anaesthetics/NSAIDs (14 tests)  [] Antibiotics & Antimycotics (14 tests)   [x] Corticosteroids (15 tests)   [] Photopatch test (62 tests)   [] others: ...      [x] Patient's own products: fluocinolone oil solution and hair dye ...    DO NOT test if chemical or biological identity is unknown!     always ask from patient the product information and safety sheets (MSDS)       Order for PRICK TESTS    Standardized prick panels  [x] Atopic panel (20 tests)  - Pediatric Panel (12 tests)  - Milk, Meat, Eggs, Grains (20 tests)   - Dust, Epithelia, Feathers (10 tests)  - Fish, Seafood, Shellfish (17 tests)  - Nuts, Beans (14 tests)  -  Spice, Vegetable, Fruit (17 tests)  - Others: ...      - Patient's own products: ...    DO NOT test if chemical or biological identity is unknown!     always ask from patient the product information and safety sheets (MSDS)     Standardized intradermal tests  [x] Penicillium notatum [x] Aspergillus fumigatus [x] House dust mites  - Bee venom   - Wasp venom !!Specific protocol with dilutions!!  - Others: ...    RESULTS & EVALUATION of PATCH TESTS    Patch test readings after     [x] 2 days, [] 3 days [x] 4 days, [] 5 days,    May 5, 2021 application of patch tests with results:    STANDARD Series        No Substance 2 days 4 days remarks    1 Tacho Mix [C] - -     2 Colophony - -     3  2-Mercaptobenzothiazole  - -      4 Methylisothiazolinone + -     5 Carba Mix - -     6 Thiuram Mix [A] - -     7 Bisphenol A Epoxy Resin - -     8 T-Xzgb-Xlwmnjibdal-Formaldehyde Resin - -     9 Mercapto Mix [A] - -     10 Black Rubber Mix- PPD [B] - -     11 Potassium Dichromate  -  -     12 Balsam of [place] (Myroxylon Pereirae Resin) + -     13 Nickel Sulphate Hexahydrate - -     14 Mixed Dialkyl Thiourea - -     15 Paraben Mix [B] - -     16 Methyldibromo Glutaronitrile - -     17 Fragrance Mix - -     18 2-Bromo-2-Nitropropane-1,3-Diol (Bronopol) NA NA     19 Lyral - -     20 Tixocortol-21- Pivalate - -     21 Diazolidiyl Urea (Germall II) - -     22 Methyl Methacrylate - -     23 Cobalt (II) Chloride Hexahydrate - -     24 Fragrance Mix II  - -     25 Compositae Mix - -     26 Benzoyl Peroxide - -     27 Bacitracin - -     28 Formaldehyde - -     29 Methylchloroisothiazolinone / Methylisothiazolinone - -     30 Corticosteroid Mix NA NA     31 Sodium Lauryl Sulfate - -     32 Lanolin Alcohol - -     33 Turpentine - -     34 Cetylstearylalcohol - -     35 Chlorhexidine Dicluconate - -     36 Budenoside - -     37 Imidazolidinyl Urea  - -     38 Ethyl-2 Cyanoacrylate NA NA     39 Quaternium 15 (Dowicil 200) - -     40 Decyl Glucoside  - -    PRESERVATIVES & ANTIMICROBIALS        No Substance 2 days 4 days remarks   41 1  1,2-Benzisothiazoline-3-One, Sodium Salt NA NA     2  1,3,5-Shaun (2-Hydroxyethyl) - Hexahydrotriazine (Grotan BK) - -     3 8-Ukqzngeahoeuz-1-Nitro-1, 3-Propanediol - -     4  3, 4, 4' - Triclocarban - -    45 5 4 - Chloro - 3 - Cresol - -     6 4 - Chloro - 4 - Xylenol (PCMX) - -     7 7-Ethylbicyclooxazolidine (Bioban SF1940) - -     8 Benzalkonium Chloride NA NA     9 Benzyl Alcohol - -    50 10 Cetalkonium Chloride - -     11 Cetylpyrimidine Chloride  - -     12 Chloroacetamide - -     13 DMDM Hydantoin - -     14 Glutaraldehyde - -    55 15 Triclosan - -     16 Glyoxal Trimeric Dihydrate - -     17 Iodopropynyl Butylcarbamate - -     18 Octylisothiazoline - -     19 Iodoform NA NA    60 20 (Nitrobutyl) Morpholine/(Ethylnitro-Trimethylene) Dimorpholine (Bioban P 1487) - -     21 Phenoxyethanol - -     22 Phenyl Salicylate NA NA     23 Povidone Iodine - -     24 Sodium Benzoate - -    65 25 Sodium Disulfite - -     26 Sorbic Acid - -     27 Thimerosal       28 Melamine Formaldehyde Resin       29 Ethylenediamine Dihydrochloride        Parabens      70 30 Butyl-P-Hydroxybenzoate - -     31 Ethyl-P-Hydroxybenzoate - -     32 Methyl-P-Hydroxybenzoate - -    73 33 Propyl-P-Hydroxybenzoate - -    EMULSIFIERS & ADDITIVES       No Substance 2 days 4 days remarks   74 1 Polyethylene Glycol-400 NA NA    75 2 Cocamidopropyl Betaine - -     3 Amerchol L101 - -     4 Propylene Glycol - -     5 Triethanolamine - -     6 Sorbitane Sesquiolate - -    80 7 Isopropylmyristate - -     8 Polysorbate 80  - -     9 Amidoamine   (Stearamidopropyl Dimethylamine) NA NA     10 Oleamidopropyl Dimethylamine - -     11 Lauryl Glucoside NA NA    85 12 Coconut Diethanolamide  - -     13 2-Hydroxy-4-Methoxy Benzophenone (Oxybenzone) - -     14 Benzophenone-4 (Sulisobenzon) NA NA     15 Propolis - -     16 Dexpanthenol - -    90 17 Carboxymethyl Cellulose  Sodium NA NA     18 Abitol - -     19 Tert-Butylhydroquinone - -     20 Benzyl Salicylate - -      Antioxidant       21 Dodecyl Gallate - -    95 22 Butylhydroxyanisole (BHA) - -     23 Butylhydroxytoluene (BHT) - -     24 Di-Alpha-Tocopherol (Vit E) - -     25 Propyl Gallate - -     26 Zinc Pyrithione NA NA    100 27 Dimethylaminopropylamin (DMAPA)      PERFUMES, FLAVORS & PLANTS        No Substance 2 days 4 days remarks   101 1 Benzyl Cinnamate - -     2 Di-Limonene (Dipentene) - -     3 Cananga Odorata (Gonzalo Sánchez) (I) - -     4 Lichen Acid Mix - -    105 5 Mentha Piperita Oil (Peppermint Oil) - -     6 Sesquiterpenelactone mix - -     7 Tea Tree Oil, Oxidized - -     8 Wood Tar Mix - -     9 Abietic Acid - -    110 10 Lavendula Angustifolia Oil (Lavender Oil) - -     11 Camphor  NA NA      Fragrance Mix I       12 Oakmoss Absolute - -     13 Eugenol - -     14 Geraniol NA NA    115 15 Hydroxycitronellal - -     16 Isoeugenol - -     17 Cinnamic Aldehyde - -     18 Cinnamic Alcohol  - -      Fragrance mix II       19 Citronellol - -    120 20 Alpha-Hexylcinnamic Aldehyde    - -     21 Citral - -     22 Farnesol - -    123 23 Coumarin - -    HAIRDRESSER        No Substance 2 days 4 days remarks   124 1 P-Phenylenediamin  +++  -    125 2 P-Toluenediamine Sulphate  -  -     3 Ammonium Thioglycolate - -     4 Ammonium Persulfate - -     5 Resorcinol - -     6 3-Aminophenol - -    130 7 P-Aminophenol - -     8 Glyceryl Monothioglycolate - -     9 Pyrogallol - -    133 10 P-Aminodiphenylamine NA NA    CORTICOSTEROIDS   No Substance 2 days 4 days remarks Allergy  class   134 1 Amcinonide - -  B   135 2 Betametasone-17,21 Dipropionate - -  D1    3 Desoximetasone - -  C    4 Betamethasone-17-Valerate - -  D1    5 Dexamethasone - -  C    6 Hydrocortisone - -  A   140 7 Clobetasol-17-Propionate - -  D1    8 Dexamethasone-21-Phosphate Disodium Salt - -  C    9 Hydrocortisone-17 Butyrate - -  D2    10 Prednisolone - -  A     11 Mometason Furoate NA NA  D1   145 12 Triamcinolone Acetonide - -  B    13 Methylprednisolone Aceponate NA NA  D2    14 Hydrocortisone-21-Acetate - -  A   148 15 Prednicarbate - -  D2     Group Characteristics of group Generic name Name  cross reactions   A Hydrocortisone   Cloprednole, Fludrocortisone acétate, Hydrocortison acetate, Methylprednisolone, Prednisolone, Tixocortolpivalate Alfacortone, Fucidin H, Dermacalm, Hexacortone, Premandole, Imacort With group D2   B Triamcinolone-acetonide   Budenoside (R-isomer), Amcinonide, Desonide, Fluocinolone acetonide, Triamcinolone acetonide Locapred, Locatop  Synalar, Pevisone, Kenacort -   C Betamethasone (Without Lianet)   Betamethasone, Dexamethasone, Flumethasone pivalate, Halomethasone Daivobet, Dexasalyl, Locasalen,   -   D1 Betamethasone-diproprionate   Betamethasone dipropionate, Betamethasone-17-valerate, Clobetasole-propionate, Fluticasone propionate, Mometasone furoate Betnovate, Diprogenta, Diprosalic, Diprosone, Celestoderm, Fucicort,  Cutivate, Axotide, Elocom -   D2 Methylprednisolone-aceponate   Hydrocortisone-aceponate, Hydrocortisone-buteprate, Hydrocortisone-17-butyrate, Methylprednisolone aceponate, Prednicarbate Locoïd, Advantan,  Prednitop With group A and Budesonide (S-isomer)     OTHER PRODUCTS        No Substance Conc  % solv 2 days 4 days remarks   153 1 Fluocinolone oil As is       154  155 2 Hydrogen Peroxide Salon care 20 30 par   154 as is  155 vaseline   156  157 3 fram color futura 30 par ++/++  156 as is  157 vaseline   158  159 4 Chiionic ammonia free color 30 par ++/++  158 as is  159 vaseline     Results of patch tests:                         Interpretation:  - Negative                    A    = Allergic      (+) Erythema    TI   = Toxic/irritant   + E + Infiltration    RaP = Relevance at Present     ++ E/I + Papulovesicle   Rpr  = Relevance Previously     +++ E/I/P + Blister     nR   = No Relevance    Atopy Screen (Placed 05/03/21  )    No Substance Readings (15 min) Evaluation   POS Histamine 1mg/ml ++    NEG NaCl 0.9% -      No Substance Readings (15 min) Evaluation   1 Alternaria alternata (tenuis)  -    2 Cladosporium herbarum -    3 Aspergillus fumigatus -    4 Penicillium notatum -    5 Dermatophagoides pteronyssinus -    6 Dermatophagoides farinae -    7 Dog epithelium (canis spp) -    8 Cat hair (stefany catus) -    9 Cockroach   (Blatella americana & germanica) -    10 Grass mix midwest   (Sarah, Orchard, Redtop, Yusuf) -    11 Paul grass (sorghum halepense) -    12 Weed mix   (common Cocklebur, Lamb s quarters, rough redroot Pigweed, Dock/Sorrel) -    13 Mug wort (artemisia vulgare) -    14 Ragweed giant/short (ambrosia spp) -    15 English Plantain (plantago lanceolata) -    16 Tree mix 1 (Pecan, Maple BHR, Oak RVW, american [place], black [place]) ++    17 Red cedar (juniperus virginia) -    18 Tree mix 2   (white [place]zar, river/red Birch, black [place], common Rowe, american Elm) -    19 Box elder/Maple mix (acer spp) -    20 Sauk shagbark (carya ovata) -           Conclusion: 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 Patient is atopic    Intradermal Testing (Placed 05/07/21??) maybe on Friday if patch tests negatives    No Substance Conc.  Readings (15min) Evaluation   - NaCl  0.9% -    + Histamine (prick) 0.1mg / ml -    DF Standard Dust Mite - D. Farinae 1:10 -    DP Standard Dust Mite - D. Pteronyssinus 1:10 -    A Aspergillus fumigatus  1:10 -    P Penicillium notatum 1:10 -      Conclusion:     [] No relevant allergic reaction observed    [x] Allergic reaction diagnosed against following allergens:   +++ PPD and the patients own hair dyes  + Methylisothiazolinone  + Balsam of [place]      Interpretation/ remarks:   See later    [] Patient information given   [] ACDS CAMP information's (# ....) to following compounds: .....   [] General information's to following compounds:  ......      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    ==> Treatment Plan:    Referred by: Dr. Sadaf Salgado    Staff: provider    Follow-up in Derm-Allergy clinic for  2nd readings and final conclusions after 4 days (in person)     __________________________    Start time: 7:10 AM  End time: 7:.25 AM    And 10min in person 10am    I spent a total of 25 minutes with Susan J Kreye during today s  visit. This time was spent discussing all the individual test results, correlating them to the clinical relevance, counseling the patient and/or coordinating care and performing allergy tests or procedures.           Again, thank you for allowing me to participate in the care of your patient.        Sincerely,        Brian Harris MD

## 2021-05-05 NOTE — PROGRESS NOTES
[facility] Dermato-allergology Note  Virtual visit: store and forward video --> patient had problems with upload of pictures and could not start a video call. Will come during this morning in person for 1st reading of patch tests  Encounter Date: May 5, 2021  ____________________________________________    CC: No chief complaint on file.      HPI:  Ms. Susan J Kreye is a(n) 71 year old female who presents today as a return patient for allergy consultation  - Follow-up in Derm-Allergy clinic for 1st readings of patch tests after 2 days (virtual)   - otherwise feeling well in usual state of health    Physical exam:  General: In no acute distress, well-developed, well-nourished  Eyes: no conjunctivitis  ENT: no signs of rhinitis   Pulmonary: no wheezing or coughing  Skin:Focused examination of the skin on test sites was performed = see test results below    Earlier History and Allergy exams:    - Has hair loss, has had rashes and reactions to various treatments.  - 1 liquid hair loss treatment was mixture of minoxidil and something else that she cannot remember, reacted to it last summer. Had rash around face and bumps on back and arms. 1/2 inch around hairline red and itchy. Seemed to be pretty quickly after applying. Red itchy rash resolved within a week of stopping. Bumps on back stayed.  - Fluocinolone oil - could feel itching as soon as she put it on. Similarly, 1/2 in around hair line red and itchy.  - was last summer on a solution of Minoxidil 0.5% and Finasterid 1.7% and reacted to it.    Past Medical History:   Patient Active Problem List   Diagnosis     Closed left ankle fracture     Family history of malignant neoplasm of ovary     Unspecified injury of muscle, fascia and tendon of right hip, sequela     Shoulder tendonitis, right     Abnormal EKG     Depression     Follow-up examination following surgery     Glaucoma     Hearing loss in right ear     Osteopenia     Hyperlipidemia      Past Medical History:   Diagnosis Date     Depression      Glaucoma      Migraines      Osteoporosis      Osteoporosis        Allergy History:     Allergies   Allergen Reactions     Penicillins      Vaginal infections       Social History:   reports that she has never smoked. She has never used smokeless tobacco. She reports current alcohol use. She reports that she does not use drugs.      Family History:  Family History   Problem Relation Age of Onset     Cancer Mother 83        Ovarian     Diabetes Mother         DM II, controlled with diet     Prostate Cancer Mother      Gastrointestinal Disease Sister         Gallbladder     Cancer Father 60        Lung     Prostate Cancer Father      Cancer Maternal Grandmother         Bladder     Prostate Cancer Maternal Grandmother      C.A.D. Paternal Grandmother      Cerebrovascular Disease Paternal Grandfather 88     Prostate Cancer Maternal Grandfather        Medications:  Current Outpatient Medications   Medication Sig Dispense Refill     Biotin 10 MG TABS tablet Take 10,000 mcg by mouth daily       brimonidine-timolol (COMBIGAN) 0.2-0.5 % ophthalmic solution 1 drop 2 times daily.       Calcium Carbonate-Vitamin D (CALCIUM + D PO) Take 1,500 mg by mouth daily.       finasteride (PROPECIA) 1 MG tablet Take 1 mg by mouth       fluocinolone acetonide (DERMA SMOOTHE/FS BODY) 0.01 % external oil Apply topically At Bedtime (Patient not taking: Reported on 4/2/2021) 118 mL 5     minoxidil (LONITEN) 2.5 MG tablet Take 2.5 mg by mouth daily       Multiple Vitamin (MULTI-VITAMIN PO) Take 1 tablet by mouth daily.       Pyrithione Zinc (DHS ZINC) 2 % SHAM Externally apply topically daily 240 mL 5     sertraline (ZOLOFT) 50 MG tablet Take 75 mg by mouth daily.         Allergy tests:    Past Allergy tests None    Current Allergy tests (or planned)      Order for PATCH TESTS    [x] Outpatient  [] Inpatient: Valerio..../ Bed ....      Skin Atopy (atopic dermatitis) [] Yes   [x]  No  Contact allergies:   [x] Yes   [] No (allergic to wool, red rash, itchy bumps)  Urticaria/Angioedema  [] Yes   [x] No (in high school, had strep infection on legs)  Rhinitis/Sinusitis:   [x] Yes   [] No   [x] seasonal (early spring)  [] perennial            Allergic Asthma:   [] Yes   [x] No  Pets :  [] Yes   [x] No  Which?...... (Allergic to cats, itchy eyes, runny nose)  Food Allergy:   [x] Yes   [x] No (migraines from ricotta cheese, chocolate, cigarette smoke)  Drug allergies:    [] Yes   [x] No (yeast infections following penicillins)            Reason for tests (suspected allergy): suspected allergy  Known previous allergies: seasonal (early spring), cats, wool intolerance     Standardized panels  [x] Standard panel (40 tests)  [x] Preservatives & Antimicrobials (31 tests)  [x] Emulsifiers & Additives (25 tests)   [x] Perfumes/Flavours & Plants (25 tests)  [x] Hairdresser panel (12 tests)  [] Rubber Chemicals (22 tests)  [] Plastics (26 tests)  [] Colorants/Dyes/Food additives (20 tests)  [] Metals (implants/dental) (24 tests)  [] Local anaesthetics/NSAIDs (14 tests)  [] Antibiotics & Antimycotics (14 tests)   [x] Corticosteroids (15 tests)   [] Photopatch test (62 tests)   [] others: ...      [x] Patient's own products: fluocinolone oil solution and hair dye ...    DO NOT test if chemical or biological identity is unknown!     always ask from patient the product information and safety sheets (MSDS)       Order for PRICK TESTS    Standardized prick panels  [x] Atopic panel (20 tests)  - Pediatric Panel (12 tests)  - Milk, Meat, Eggs, Grains (20 tests)   - Dust, Epithelia, Feathers (10 tests)  - Fish, Seafood, Shellfish (17 tests)  - Nuts, Beans (14 tests)  - Spice, Vegetable, Fruit (17 tests)  - Others: ...      - Patient's own products: ...    DO NOT test if chemical or biological identity is unknown!     always ask from patient the product information and safety sheets (MSDS)     Standardized intradermal  tests  [x] Penicillium notatum [x] Aspergillus fumigatus [x] House dust mites  - Bee venom   - Wasp venom !!Specific protocol with dilutions!!  - Others: ...    RESULTS & EVALUATION of PATCH TESTS    Patch test readings after     [x] 2 days, [] 3 days [x] 4 days, [] 5 days,    May 5, 2021 application of patch tests with results:    STANDARD Series        No Substance 2 days 4 days remarks    1 Tacho Mix [C] - -     2 Colophony - -     3  2-Mercaptobenzothiazole  - -      4 Methylisothiazolinone + -     5 Carba Mix - -     6 Thiuram Mix [A] - -     7 Bisphenol A Epoxy Resin - -     8 Z-Bdkv-Aahxzctzyig-Formaldehyde Resin - -     9 Mercapto Mix [A] - -     10 Black Rubber Mix- PPD [B] - -     11 Potassium Dichromate  -  -     12 Balsam of [place] (Myroxylon Pereirae Resin) + -     13 Nickel Sulphate Hexahydrate - -     14 Mixed Dialkyl Thiourea - -     15 Paraben Mix [B] - -     16 Methyldibromo Glutaronitrile - -     17 Fragrance Mix - -     18 2-Bromo-2-Nitropropane-1,3-Diol (Bronopol) NA NA     19 Lyral - -     20 Tixocortol-21- Pivalate - -     21 Diazolidiyl Urea (Germall II) - -     22 Methyl Methacrylate - -     23 Cobalt (II) Chloride Hexahydrate - -     24 Fragrance Mix II  - -     25 Compositae Mix - -     26 Benzoyl Peroxide - -     27 Bacitracin - -     28 Formaldehyde - -     29 Methylchloroisothiazolinone / Methylisothiazolinone - -     30 Corticosteroid Mix NA NA     31 Sodium Lauryl Sulfate - -     32 Lanolin Alcohol - -     33 Turpentine - -     34 Cetylstearylalcohol - -     35 Chlorhexidine Dicluconate - -     36 Budenoside - -     37 Imidazolidinyl Urea  - -     38 Ethyl-2 Cyanoacrylate NA NA     39 Quaternium 15 (Dowicil 200) - -     40 Decyl Glucoside - -    PRESERVATIVES & ANTIMICROBIALS        No Substance 2 days 4 days remarks   41 1  1,2-Benzisothiazoline-3-One, Sodium Salt NA NA     2  1,3,5-Shaun (2-Hydroxyethyl) - Hexahydrotriazine (Grotan BK) - -     3 1-Ncekuwqfannig-3-Nitro-1, 3-Propanediol  - -     4  3, 4, 4' - Triclocarban - -    45 5 4 - Chloro - 3 - Cresol - -     6 4 - Chloro - 4 - Xylenol (PCMX) - -     7 7-Ethylbicyclooxazolidine (Bioban [id number]) - -     8 Benzalkonium Chloride NA NA     9 Benzyl Alcohol - -    50 10 Cetalkonium Chloride - -     11 Cetylpyrimidine Chloride  - -     12 Chloroacetamide - -     13 DMDM Hydantoin - -     14 Glutaraldehyde - -    55 15 Triclosan - -     16 Glyoxal Trimeric Dihydrate - -     17 Iodopropynyl Butylcarbamate - -     18 Octylisothiazoline - -     19 Iodoform NA NA    60 20 (Nitrobutyl) Morpholine/(Ethylnitro-Trimethylene) Dimorpholine ([employer] P 1487) - -     21 Phenoxyethanol - -     22 Phenyl Salicylate NA NA     23 Povidone Iodine - -     24 Sodium Benzoate - -    65 25 Sodium Disulfite - -     26 Sorbic Acid - -     27 Thimerosal       28 Melamine Formaldehyde Resin       29 Ethylenediamine Dihydrochloride        Parabens      70 30 Butyl-P-Hydroxybenzoate - -     31 Ethyl-P-Hydroxybenzoate - -     32 Methyl-P-Hydroxybenzoate - -    73 33 Propyl-P-Hydroxybenzoate - -    EMULSIFIERS & ADDITIVES       No Substance 2 days 4 days remarks   74 1 Polyethylene Glycol-400 NA NA    75 2 Cocamidopropyl Betaine - -     3 Amerchol L101 - -     4 Propylene Glycol - -     5 Triethanolamine - -     6 Sorbitane Sesquiolate - -    80 7 Isopropylmyristate - -     8 Polysorbate 80  - -     9 Amidoamine   (Stearamidopropyl Dimethylamine) NA NA     10 Oleamidopropyl Dimethylamine - -     11 Lauryl Glucoside NA NA    85 12 Coconut Diethanolamide  - -     13 2-Hydroxy-4-Methoxy Benzophenone (Oxybenzone) - -     14 Benzophenone-4 (Sulisobenzon) NA NA     15 Propolis - -     16 Dexpanthenol - -    90 17 Carboxymethyl Cellulose Sodium NA NA     18 Abitol - -     19 Tert-Butylhydroquinone - -     20 Benzyl Salicylate - -      Antioxidant       21 Dodecyl Gallate - -    95 22 Butylhydroxyanisole (BHA) - -     23 Butylhydroxytoluene (BHT) - -     24 Di-Alpha-Tocopherol (Vit E) - -      25 Propyl Gallate - -     26 Zinc Pyrithione NA NA    100 27 Dimethylaminopropylamin (DMAPA)      PERFUMES, FLAVORS & PLANTS        No Substance 2 days 4 days remarks   101 1 Benzyl Cinnamate - -     2 Di-Limonene (Dipentene) - -     3 Cananga Odorata (Gonzalo Sánchez) (I) - -     4 Lichen Acid Mix - -    105 5 Mentha Piperita Oil (Peppermint Oil) - -     6 Sesquiterpenelactone mix - -     7 Tea Tree Oil, Oxidized - -     8 Wood Tar Mix - -     9 Abietic Acid - -    110 10 Lavendula Angustifolia Oil (Lavender Oil) - -     11 Camphor  NA NA      Fragrance Mix I       12 Oakmoss Absolute - -     13 Eugenol - -     14 Geraniol NA NA    115 15 Hydroxycitronellal - -     16 Isoeugenol - -     17 Cinnamic Aldehyde - -     18 Cinnamic Alcohol  - -      Fragrance mix II       19 Citronellol - -    120 20 Alpha-Hexylcinnamic Aldehyde    - -     21 Citral - -     22 Farnesol - -    123 23 Coumarin - -    HAIRDRESSER        No Substance 2 days 4 days remarks   124 1 P-Phenylenediamin  +++  -    125 2 P-Toluenediamine Sulphate  -  -     3 Ammonium Thioglycolate - -     4 Ammonium Persulfate - -     5 Resorcinol - -     6 3-Aminophenol - -    130 7 P-Aminophenol - -     8 Glyceryl Monothioglycolate - -     9 Pyrogallol - -    133 10 P-Aminodiphenylamine NA NA    CORTICOSTEROIDS   No Substance 2 days 4 days remarks Allergy  class   134 1 Amcinonide - -  B   135 2 Betametasone-17,21 Dipropionate - -  D1    3 Desoximetasone - -  C    4 Betamethasone-17-Valerate - -  D1    5 Dexamethasone - -  C    6 Hydrocortisone - -  A   140 7 Clobetasol-17-Propionate - -  D1    8 Dexamethasone-21-Phosphate Disodium Salt - -  C    9 Hydrocortisone-17 Butyrate - -  D2    10 Prednisolone - -  A    11 Mometason Furoate NA NA  D1   145 12 Triamcinolone Acetonide - -  B    13 Methylprednisolone Aceponate NA NA  D2    14 Hydrocortisone-21-Acetate - -  A   148 15 Prednicarbate - -  D2     Group Characteristics of group Generic name Name  cross reactions    A Hydrocortisone   Cloprednole, Fludrocortisone acétate, Hydrocortison acetate, Methylprednisolone, Prednisolone, Tixocortolpivalate Alfacortone, Fucidin H, Dermacalm, Hexacortone, Premandole, Imacort With group D2   B Triamcinolone-acetonide   Budenoside (R-isomer), Amcinonide, Desonide, Fluocinolone acetonide, Triamcinolone acetonide Locapred, Locatop  Synalar, Pevisone, Kenacort -   C Betamethasone (Without Lianet)   Betamethasone, Dexamethasone, Flumethasone pivalate, Halomethasone Daivobet, Dexasalyl, Locasalen,   -   D1 Betamethasone-diproprionate   Betamethasone dipropionate, Betamethasone-17-valerate, Clobetasole-propionate, Fluticasone propionate, Mometasone furoate Betnovate, Diprogenta, Diprosalic, Diprosone, Celestoderm, Fucicort,  Cutivate, Axotide, Elocom -   D2 Methylprednisolone-aceponate   Hydrocortisone-aceponate, Hydrocortisone-buteprate, Hydrocortisone-17-butyrate, Methylprednisolone aceponate, Prednicarbate Locoïd, Advantan,  Prednitop With group A and Budesonide (S-isomer)     OTHER PRODUCTS        No Substance Conc  % solv 2 days 4 days remarks   153 1 Fluocinolone oil As is       154  155 2 Hydrogen Peroxide Salon care 20 30 par   154 as is  155 vaseline   156  157 3 fram color futura 30 par ++/++  156 as is  157 vaseline   158  159 4 Chiionic ammonia free color 30 par ++/++  158 as is  159 vaseline     Results of patch tests:                         Interpretation:  - Negative                    A    = Allergic      (+) Erythema    TI   = Toxic/irritant   + E + Infiltration    RaP = Relevance at Present     ++ E/I + Papulovesicle   Rpr  = Relevance Previously     +++ E/I/P + Blister     nR   = No Relevance    Atopy Screen (Placed 05/03/21 )    No Substance Readings (15 min) Evaluation   POS Histamine 1mg/ml ++    NEG NaCl 0.9% -      No Substance Readings (15 min) Evaluation   1 Alternaria alternata (tenuis)  -    2 Cladosporium herbarum -    3 Aspergillus fumigatus -    4 Penicillium  notatum -    5 Dermatophagoides pteronyssinus -    6 Dermatophagoides farinae -    7 Dog epithelium (canis spp) -    8 Cat hair (stefany catus) -    9 Cockroach   (Blatella americana & germanica) -    10 Grass mix midwest   (Sarah, Orchard, Redtop, Yusuf) -    11 Paul grass (sorghum halepense) -    12 Weed mix   (common Cocklebur, Lamb s quarters, rough redroot Pigweed, Dock/Sorrel) -    13 Mug wort (artemisia vulgare) -    14 Ragweed giant/short (ambrosia spp) -    15 English Plantain (plantago lanceolata) -    16 Tree mix 1 (Pecan, Maple BHR, Oak RVW, american Harvard, black Bergholz) ++    17 Red cedar (juniperus virginia) -    18 Tree mix 2   (white Baltazar, river/red Birch, black Berlin, common Arthur, american Elm) -    19 Box elder/Maple mix (acer spp) -    20 Coos shagbark (carya ovata) -           Conclusion: 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 Patient is atopic    Intradermal Testing (Placed 05/07/21??) maybe on Friday if patch tests negatives    No Substance Conc.  Readings (15min) Evaluation   - NaCl  0.9% -    + Histamine (prick) 0.1mg / ml -    DF Standard Dust Mite - D. Farinae 1:10 -    DP Standard Dust Mite - D. Pteronyssinus 1:10 -    A Aspergillus fumigatus  1:10 -    P Penicillium notatum 1:10 -      Conclusion:     [] No relevant allergic reaction observed    [x] Allergic reaction diagnosed against following allergens:   +++ PPD and the patients own hair dyes  + Methylisothiazolinone  + Balsam of [place]      Interpretation/ remarks:   See later    [] Patient information given   [] ACDS CAMP information's (# ....) to following compounds: .....   [] General information's to following compounds: ......      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    ==> Treatment Plan:    Referred by: Dr. Sadaf Salgado    Staff: provider    Follow-up in Derm-Allergy clinic for  2nd readings and final conclusions after 4 days (in person)     __________________________    Start time: 7:10 AM  End time: 7:.25 AM    And 10min in person 10am    I spent a total of 25 minutes with Susan J Kreye during today s  visit. This time was spent discussing all the individual test results, correlating them to the clinical relevance, counseling the patient and/or coordinating care and performing allergy tests or procedures.

## 2021-05-07 ENCOUNTER — OFFICE VISIT (OUTPATIENT)
Dept: ALLERGY | Facility: CLINIC | Age: 71
End: 2021-05-07
Payer: MEDICARE

## 2021-05-07 DIAGNOSIS — L23.89 ALLERGIC CONTACT DERMATITIS DUE TO OTHER AGENTS: Primary | ICD-10-CM

## 2021-05-07 PROCEDURE — 99214 OFFICE O/P EST MOD 30 MIN: CPT | Performed by: DERMATOLOGY

## 2021-05-07 RX ORDER — TRIAMCINOLONE ACETONIDE 1 MG/G
CREAM TOPICAL 2 TIMES DAILY
Qty: 30 G | Refills: 1 | Status: SHIPPED | OUTPATIENT
Start: 2021-05-07 | End: 2021-05-12

## 2021-05-07 NOTE — LETTER
5/7/2021         RE: Susan J Kreye  [address]   [place] [address]        Dear Colleague,    Thank you for referring your patient, Susan J Kreye, to the [facility] [place]. Please see a copy of my visit note below.    [facility] Dermato-allergology Note  Office visit  Encounter Date: May 7, 2021  ____________________________________________    CC: No chief complaint on file.      HPI:  Ms. Susan J Kreye is a(n) 71 year old female who presents today as a return patient for allergy consultation  - Follow-up in Derm-Allergy clinic for  2nd readings and final conclusions after 4 days  - otherwise feeling well in usual state of health    Physical exam:  General: In no acute distress, well-developed, well-nourished  Eyes: no conjunctivitis  ENT: no signs of rhinitis   Pulmonary: no wheezing or coughing  Skin:Focused examination of the skin on test sites was performed = see test results below    Earlier History and Allergy exams:    - Has hair loss, has had rashes and reactions to various treatments.  - 1 liquid hair loss treatment was mixture of minoxidil and something else that she cannot remember, reacted to it last summer. Had rash around face and bumps on back and arms. 1/2 inch around hairline red and itchy. Seemed to be pretty quickly after applying. Red itchy rash resolved within a week of stopping. Bumps on back stayed.  - Fluocinolone oil - could feel itching as soon as she put it on. Similarly, 1/2 in around hair line red and itchy.  - was last summer on a solution of Minoxidil 0.5% and Finasterid 1.7% and reacted to it.    Past Medical History:   Patient Active Problem List   Diagnosis     Closed left ankle fracture     Family history of malignant neoplasm of ovary     Unspecified injury of muscle, fascia and tendon of right hip, sequela     Shoulder tendonitis, right     Abnormal EKG     Depression     Follow-up examination following surgery      Glaucoma     Hearing loss in right ear     Osteopenia     Hyperlipidemia     Past Medical History:   Diagnosis Date     Depression      Glaucoma      Migraines      Osteoporosis      Osteoporosis        Allergy History:     Allergies   Allergen Reactions     Penicillins      Vaginal infections       Social History:   reports that she has never smoked. She has never used smokeless tobacco. She reports current alcohol use. She reports that she does not use drugs.      Family History:  Family History   Problem Relation Age of Onset     Cancer Mother 83        Ovarian     Diabetes Mother         DM II, controlled with diet     Prostate Cancer Mother      Gastrointestinal Disease Sister         Gallbladder     Cancer Father 60        Lung     Prostate Cancer Father      Cancer Maternal Grandmother         Bladder     Prostate Cancer Maternal Grandmother      C.A.D. Paternal Grandmother      Cerebrovascular Disease Paternal Grandfather 88     Prostate Cancer Maternal Grandfather        Medications:  Current Outpatient Medications   Medication Sig Dispense Refill     Biotin 10 MG TABS tablet Take 10,000 mcg by mouth daily       brimonidine-timolol (COMBIGAN) 0.2-0.5 % ophthalmic solution 1 drop 2 times daily.       Calcium Carbonate-Vitamin D (CALCIUM + D PO) Take 1,500 mg by mouth daily.       finasteride (PROPECIA) 1 MG tablet Take 1 mg by mouth       fluocinolone acetonide (DERMA SMOOTHE/FS BODY) 0.01 % external oil Apply topically At Bedtime (Patient not taking: Reported on 4/2/2021) 118 mL 5     minoxidil (LONITEN) 2.5 MG tablet Take 2.5 mg by mouth daily       Multiple Vitamin (MULTI-VITAMIN PO) Take 1 tablet by mouth daily.       Pyrithione Zinc (DHS ZINC) 2 % SHAM Externally apply topically daily 240 mL 5     sertraline (ZOLOFT) 50 MG tablet Take 75 mg by mouth daily.         Allergy tests:    Past Allergy tests None    Current Allergy tests (or planned)      Order for PATCH TESTS    [x] Outpatient  [] Inpatient:  Valerio..../ Bed ....      Skin Atopy (atopic dermatitis) [] Yes   [x] No  Contact allergies:   [x] Yes   [] No (allergic to wool, red rash, itchy bumps)  Urticaria/Angioedema  [] Yes   [x] No (in high school, had strep infection on legs)  Rhinitis/Sinusitis:   [x] Yes   [] No   [x] seasonal (early spring)  [] perennial            Allergic Asthma:   [] Yes   [x] No  Pets :  [] Yes   [x] No  Which?...... (Allergic to cats, itchy eyes, runny nose)  Food Allergy:   [x] Yes   [x] No (migraines from ricotta cheese, chocolate, cigarette smoke)  Drug allergies:    [] Yes   [x] No (yeast infections following penicillins)            Reason for tests (suspected allergy): suspected allergy  Known previous allergies: seasonal (early spring), cats, wool intolerance     Standardized panels  [x] Standard panel (40 tests)  [x] Preservatives & Antimicrobials (31 tests)  [x] Emulsifiers & Additives (25 tests)   [x] Perfumes/Flavours & Plants (25 tests)  [x] Hairdresser panel (12 tests)  [] Rubber Chemicals (22 tests)  [] Plastics (26 tests)  [] Colorants/Dyes/Food additives (20 tests)  [] Metals (implants/dental) (24 tests)  [] Local anaesthetics/NSAIDs (14 tests)  [] Antibiotics & Antimycotics (14 tests)   [x] Corticosteroids (15 tests)   [] Photopatch test (62 tests)   [] others: ...      [x] Patient's own products: fluocinolone oil solution and hair dye ...    DO NOT test if chemical or biological identity is unknown!     always ask from patient the product information and safety sheets (MSDS)       Order for PRICK TESTS    Standardized prick panels  [x] Atopic panel (20 tests)  - Pediatric Panel (12 tests)  - Milk, Meat, Eggs, Grains (20 tests)   - Dust, Epithelia, Feathers (10 tests)  - Fish, Seafood, Shellfish (17 tests)  - Nuts, Beans (14 tests)  - Spice, Vegetable, Fruit (17 tests)  - Others: ...      - Patient's own products: ...    DO NOT test if chemical or biological identity is unknown!     always ask from patient the  product information and safety sheets (MSDS)     Standardized intradermal tests  [x] Penicillium notatum [x] Aspergillus fumigatus [x] House dust mites  - Bee venom   - Wasp venom !!Specific protocol with dilutions!!  - Others: ...    RESULTS & EVALUATION of PATCH TESTS    Patch test readings after     [x] 2 days, [] 3 days [x] 4 days, [] 5 days,    May 7, 2021 application of patch tests with results:    STANDARD Series        No Substance 2 days 4 days remarks    1 Tacho Mix [C] - -     2 Colophony - -     3  2-Mercaptobenzothiazole  - -      4 Methylisothiazolinone + +/++     5 Carba Mix - -     6 Thiuram Mix [A] - -     7 Bisphenol A Epoxy Resin - -     8 K-Tkxw-Uwoergmuszu-Formaldehyde Resin - -     9 Mercapto Mix [A] - -     10 Black Rubber Mix- PPD [B] - -     11 Potassium Dichromate  -  -     12 Balsam of [place] (Myroxylon Pereirae Resin) + -     13 Nickel Sulphate Hexahydrate - -     14 Mixed Dialkyl Thiourea - -     15 Paraben Mix [B] - -     16 Methyldibromo Glutaronitrile - -     17 Fragrance Mix - -     18 2-Bromo-2-Nitropropane-1,3-Diol (Bronopol) NA NA     19 Lyral - -     20 Tixocortol-21- Pivalate - -     21 Diazolidiyl Urea (Germall II) - -     22 Methyl Methacrylate - -     23 Cobalt (II) Chloride Hexahydrate - -     24 Fragrance Mix II  - -     25 Compositae Mix - -     26 Benzoyl Peroxide - -     27 Bacitracin - -     28 Formaldehyde - -     29 Methylchloroisothiazolinone / Methylisothiazolinone - +     30 Corticosteroid Mix NA NA     31 Sodium Lauryl Sulfate - -     32 Lanolin Alcohol - -     33 Turpentine - -     34 Cetylstearylalcohol - -     35 Chlorhexidine Dicluconate - -     36 Budenoside - -     37 Imidazolidinyl Urea  - -     38 Ethyl-2 Cyanoacrylate NA NA     39 Quaternium 15 (Dowicil 200) - -     40 Decyl Glucoside - -    PRESERVATIVES & ANTIMICROBIALS        No Substance 2 days 4 days remarks   41 1  1,2-Benzisothiazoline-3-One, Sodium Salt NA NA     2  1,3,5-Shaun (2-Hydroxyethyl) -  Hexahydrotriazine (Grotan BK) - -     3 2-Xnkxfjiwriiey-6-Nitro-1, 3-Propanediol - -     4  3, 4, 4' - Triclocarban - -    45 5 4 - Chloro - 3 - Cresol - -     6 4 - Chloro - 4 - Xylenol (PCMX) - -     7 7-Ethylbicyclooxazolidine (Bioban RH5783) - -     8 Benzalkonium Chloride NA NA     9 Benzyl Alcohol - -    50 10 Cetalkonium Chloride - -     11 Cetylpyrimidine Chloride  - -     12 Chloroacetamide - -     13 DMDM Hydantoin - -     14 Glutaraldehyde - -    55 15 Triclosan - -     16 Glyoxal Trimeric Dihydrate - -     17 Iodopropynyl Butylcarbamate - -     18 Octylisothiazoline - -     19 Iodoform NA NA    60 20 (Nitrobutyl) Morpholine/(Ethylnitro-Trimethylene) Dimorpholine (Bioban P 1487) - -     21 Phenoxyethanol - -     22 Phenyl Salicylate NA NA     23 Povidone Iodine - -     24 Sodium Benzoate - -    65 25 Sodium Disulfite - -     26 Sorbic Acid - -     27 Thimerosal       28 Melamine Formaldehyde Resin       29 Ethylenediamine Dihydrochloride        Parabens      70 30 Butyl-P-Hydroxybenzoate - -     31 Ethyl-P-Hydroxybenzoate - -     32 Methyl-P-Hydroxybenzoate - -    73 33 Propyl-P-Hydroxybenzoate - -    EMULSIFIERS & ADDITIVES       No Substance 2 days 4 days remarks   74 1 Polyethylene Glycol-400 NA NA    75 2 Cocamidopropyl Betaine - -     3 Amerchol L101 - -     4 Propylene Glycol - -     5 Triethanolamine - -     6 Sorbitane Sesquiolate - -    80 7 Isopropylmyristate - -     8 Polysorbate 80  - -     9 Amidoamine   (Stearamidopropyl Dimethylamine) NA NA     10 Oleamidopropyl Dimethylamine - -     11 Lauryl Glucoside NA NA    85 12 Coconut Diethanolamide  - -     13 2-Hydroxy-4-Methoxy Benzophenone (Oxybenzone) - -     14 Benzophenone-4 (Sulisobenzon) NA NA     15 Propolis - -     16 Dexpanthenol - -    90 17 Carboxymethyl Cellulose Sodium NA NA     18 Abitol - -     19 Tert-Butylhydroquinone - -     20 Benzyl Salicylate - -      Antioxidant       21 Dodecyl Gallate - -    95 22 Butylhydroxyanisole (BHA)  - -     23 Butylhydroxytoluene (BHT) - -     24 Di-Alpha-Tocopherol (Vit E) - -     25 Propyl Gallate - -     26 Zinc Pyrithione NA NA    100 27 Dimethylaminopropylamin (DMAPA)      PERFUMES, FLAVORS & PLANTS        No Substance 2 days 4 days remarks   101 1 Benzyl Cinnamate - -     2 Di-Limonene (Dipentene) - -     3 Cananga Odorata (Gonzalo Sánchez) (I) - -     4 Lichen Acid Mix - -    105 5 Mentha Piperita Oil (Peppermint Oil) - -     6 Sesquiterpenelactone mix - -     7 Tea Tree Oil, Oxidized - -     8 Wood Tar Mix - -     9 Abietic Acid - -    110 10 Lavendula Angustifolia Oil (Lavender Oil) - -     11 Camphor  NA NA      Fragrance Mix I       12 Oakmoss Absolute - -     13 Eugenol - -     14 Geraniol NA NA    115 15 Hydroxycitronellal - -     16 Isoeugenol - -     17 Cinnamic Aldehyde - -     18 Cinnamic Alcohol  - -      Fragrance mix II       19 Citronellol - -    120 20 Alpha-Hexylcinnamic Aldehyde    - -     21 Citral - -     22 Farnesol - -    123 23 Coumarin - -    HAIRDRESSER        No Substance 2 days 4 days remarks   124 1 P-Phenylenediamin  +++  +++    125 2 P-Toluenediamine Sulphate  -  -     3 Ammonium Thioglycolate - -     4 Ammonium Persulfate - -     5 Resorcinol - -     6 3-Aminophenol - -    130 7 P-Aminophenol - -     8 Glyceryl Monothioglycolate - -     9 Pyrogallol - -    133 10 P-Aminodiphenylamine NA NA    CORTICOSTEROIDS   No Substance 2 days 4 days remarks Allergy  class   134 1 Amcinonide - -  B   135 2 Betametasone-17,21 Dipropionate - -  D1    3 Desoximetasone - -  C    4 Betamethasone-17-Valerate - -  D1    5 Dexamethasone - -  C    6 Hydrocortisone - -  A   140 7 Clobetasol-17-Propionate - -  D1    8 Dexamethasone-21-Phosphate Disodium Salt - -  C    9 Hydrocortisone-17 Butyrate - -  D2    10 Prednisolone - -  A    11 Mometason Furoate NA NA  D1   145 12 Triamcinolone Acetonide - -  B    13 Methylprednisolone Aceponate NA NA  D2    14 Hydrocortisone-21-Acetate - -  A   148 15  Prednicarbate - -  D2     Group Characteristics of group Generic name Name  cross reactions   A Hydrocortisone   Cloprednole, Fludrocortisone acétate, Hydrocortison acetate, Methylprednisolone, Prednisolone, Tixocortolpivalate Alfacortone, Fucidin H, Dermacalm, Hexacortone, Premandole, Imacort With group D2   B Triamcinolone-acetonide   Budenoside (R-isomer), Amcinonide, Desonide, Fluocinolone acetonide, Triamcinolone acetonide Locapred, Locatop  Synalar, Pevisone, Kenacort -   C Betamethasone (Without Lianet)   Betamethasone, Dexamethasone, Flumethasone pivalate, Halomethasone Daivobet, Dexasalyl, Locasalen,   -   D1 Betamethasone-diproprionate   Betamethasone dipropionate, Betamethasone-17-valerate, Clobetasole-propionate, Fluticasone propionate, Mometasone furoate Betnovate, Diprogenta, Diprosalic, Diprosone, Celestoderm, Fucicort,  Cutivate, Axotide, Elocom -   D2 Methylprednisolone-aceponate   Hydrocortisone-aceponate, Hydrocortisone-buteprate, Hydrocortisone-17-butyrate, Methylprednisolone aceponate, Prednicarbate Locoïd, Advantan,  Prednitop With group A and Budesonide (S-isomer)     OTHER PRODUCTS        No Substance Conc  % solv 2 days 4 days remarks   153 1 Fluocinolone oil As is  - -    154  155 2 Hydrogen Peroxide Salon care 20 30 par - - 154 as is  155 vaseline   156  157 3 fram color futura 30 par ++/++ ++/++ 156 as is  157 vaseline   158  159 4 Chiionic ammonia free color 30 par ++/++ ++/++ 158 as is  159 vaseline     Results of patch tests:                         Interpretation:  - Negative                    A    = Allergic      (+) Erythema    TI   = Toxic/irritant   + E + Infiltration    RaP = Relevance at Present     ++ E/I + Papulovesicle   Rpr  = Relevance Previously     +++ E/I/P + Blister     nR   = No Relevance    Atopy Screen (Placed 05/03/21 )    No Substance Readings (15 min) Evaluation   POS Histamine 1mg/ml ++    NEG NaCl 0.9% -      No Substance Readings (15 min) Evaluation   1  Alternaria alternata (tenuis)  -    2 Cladosporium herbarum -    3 Aspergillus fumigatus -    4 Penicillium notatum -    5 Dermatophagoides pteronyssinus -    6 Dermatophagoides farinae -    7 Dog epithelium (canis spp) -    8 Cat hair (stefany catus) -    9 Cockroach   (Blatella americana & germanica) -    10 Grass mix midwest   (Sarah, Orchard, Redtop, Yusuf) -    11 Paul grass (sorghum halepense) -    12 Weed mix   (common Cocklebur, Lamb s quarters, rough redroot Pigweed, Dock/Sorrel) -    13 Mug wort (artemisia vulgare) -    14 Ragweed giant/short (ambrosia spp) -    15 English Plantain (plantago lanceolata) -    16 Tree mix 1 (Pecan, Maple BHR, Oak RVW, american [place], black Peacham) ++    17 Red cedar (juniperus virginia) -    18 Tree mix 2   (white [place], river/red Birch, black [place], common [place], american Elm) -    19 Box elder/Maple mix (acer spp) -    20 [place] shagbark (carya ovata) -           Conclusion: 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 Patient is atopic    [] No relevant allergic reaction observed    [x] Allergic reaction diagnosed against following allergens:   +++ PPD and the patients own hair dyes  +/++ Methylisothiazolinone      Interpretation/ remarks:   Patient has a severe allergy to PPD and has to avoid in future all hair dyes containing PPD.   In addition, patient has an allergy to the preservative Methylisothazolinone, that usually replaces the Parabens (patient has NO allergy to Parabens)    [x] Patient information given   [x] ACDS CAMP information's (# [id number], and [id number]) to following  compounds: PPD and Methylisothiazolinone   [] General information's to following compounds: ......      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    ==> Treatment Plan:  >> stop using hair dyes containing PPD  >> avoid products containing Methylisothiazolinone = follow the CAMP Diego recommendations    Referred by: Dr. Sadaf Salgado    Staff: provider    Follow-up in Derm-Allergy clinic if necessary, maybe for PPD trial  ___________________________    I spent a total of 34 minutes face to face with Susan J Kreye during today s office visit. Over 50% of this time was spent reading the patch tests, discussing all the test results, correlating them to the clinical relevance, counseling the patient and/or coordinating care. Moreover time was spent to install and explain the CAMP Diego. Please see Assessment and Plan for details.            Again, thank you for allowing me to participate in the care of your patient.        Sincerely,        Brian Harris MD

## 2021-05-07 NOTE — PATIENT INSTRUCTIONS
RESULTS & EVALUATION of PATCH TESTS    Patch test readings after     [x] 2 days, [] 3 days [x] 4 days, [] 5 days,    May 7, 2021 application of patch tests with results:    STANDARD Series        No Substance 2 days 4 days remarks    1 Tacho Mix [C] - -     2 Colophony - -     3  2-Mercaptobenzothiazole  - -      4 Methylisothiazolinone + +/++     5 Carba Mix - -     6 Thiuram Mix [A] - -     7 Bisphenol A Epoxy Resin - -     8 R-Yoby-Ehwaobgdfuh-Formaldehyde Resin - -     9 Mercapto Mix [A] - -     10 Black Rubber Mix- PPD [B] - -     11 Potassium Dichromate  -  -     12 Balsam of [place] (Myroxylon Pereirae Resin) + -     13 Nickel Sulphate Hexahydrate - -     14 Mixed Dialkyl Thiourea - -     15 Paraben Mix [B] - -     16 Methyldibromo Glutaronitrile - -     17 Fragrance Mix - -     18 2-Bromo-2-Nitropropane-1,3-Diol (Bronopol) NA NA     19 Lyral - -     20 Tixocortol-21- Pivalate - -     21 Diazolidiyl Urea (Germall II) - -     22 Methyl Methacrylate - -     23 Cobalt (II) Chloride Hexahydrate - -     24 Fragrance Mix II  - -     25 Compositae Mix - -     26 Benzoyl Peroxide - -     27 Bacitracin - -     28 Formaldehyde - -     29 Methylchloroisothiazolinone / Methylisothiazolinone - +     30 Corticosteroid Mix NA NA     31 Sodium Lauryl Sulfate - -     32 Lanolin Alcohol - -     33 Turpentine - -     34 Cetylstearylalcohol - -     35 Chlorhexidine Dicluconate - -     36 Budenoside - -     37 Imidazolidinyl Urea  - -     38 Ethyl-2 Cyanoacrylate NA NA     39 Quaternium 15 (Dowicil 200) - -     40 Decyl Glucoside - -    PRESERVATIVES & ANTIMICROBIALS        No Substance 2 days 4 days remarks   41 1  1,2-Benzisothiazoline-3-One, Sodium Salt NA NA     2  1,3,5-Shaun (2-Hydroxyethyl) - Hexahydrotriazine (Grotan BK) - -     3 5-Dtobcggzpnlxo-1-Nitro-1, 3-Propanediol - -     4  3, 4, 4' - Triclocarban - -    45 5 4 - Chloro - 3 - Cresol - -     6 4 - Chloro - 4 - Xylenol (PCMX) - -     7 7-Ethylbicyclooxazolidine (Bioban  LC0780) - -     8 Benzalkonium Chloride NA NA     9 Benzyl Alcohol - -    50 10 Cetalkonium Chloride - -     11 Cetylpyrimidine Chloride  - -     12 Chloroacetamide - -     13 DMDM Hydantoin - -     14 Glutaraldehyde - -    55 15 Triclosan - -     16 Glyoxal Trimeric Dihydrate - -     17 Iodopropynyl Butylcarbamate - -     18 Octylisothiazoline - -     19 Iodoform NA NA    60 20 (Nitrobutyl) Morpholine/(Ethylnitro-Trimethylene) Dimorpholine (Bioban P 1487) - -     21 Phenoxyethanol - -     22 Phenyl Salicylate NA NA     23 Povidone Iodine - -     24 Sodium Benzoate - -    65 25 Sodium Disulfite - -     26 Sorbic Acid - -     27 Thimerosal       28 Melamine Formaldehyde Resin       29 Ethylenediamine Dihydrochloride        Parabens      70 30 Butyl-P-Hydroxybenzoate - -     31 Ethyl-P-Hydroxybenzoate - -     32 Methyl-P-Hydroxybenzoate - -    73 33 Propyl-P-Hydroxybenzoate - -    EMULSIFIERS & ADDITIVES       No Substance 2 days 4 days remarks   74 1 Polyethylene Glycol-400 NA NA    75 2 Cocamidopropyl Betaine - -     3 Amerchol L101 - -     4 Propylene Glycol - -     5 Triethanolamine - -     6 Sorbitane Sesquiolate - -    80 7 Isopropylmyristate - -     8 Polysorbate 80  - -     9 Amidoamine   (Stearamidopropyl Dimethylamine) NA NA     10 Oleamidopropyl Dimethylamine - -     11 Lauryl Glucoside NA NA    85 12 Coconut Diethanolamide  - -     13 2-Hydroxy-4-Methoxy Benzophenone (Oxybenzone) - -     14 Benzophenone-4 (Sulisobenzon) NA NA     15 Propolis - -     16 Dexpanthenol - -    90 17 Carboxymethyl Cellulose Sodium NA NA     18 Abitol - -     19 Tert-Butylhydroquinone - -     20 Benzyl Salicylate - -      Antioxidant       21 Dodecyl Gallate - -    95 22 Butylhydroxyanisole (BHA) - -     23 Butylhydroxytoluene (BHT) - -     24 Di-Alpha-Tocopherol (Vit E) - -     25 Propyl Gallate - -     26 Zinc Pyrithione NA NA    100 27 Dimethylaminopropylamin (DMAPA)      PERFUMES, FLAVORS & PLANTS        No Substance 2 days 4  days remarks   101 1 Benzyl Cinnamate - -     2 Di-Limonene (Dipentene) - -     3 Cananga Odorata (Gonzalo Sánchez) (I) - -     4 Lichen Acid Mix - -    105 5 Mentha Piperita Oil (Peppermint Oil) - -     6 Sesquiterpenelactone mix - -     7 Tea Tree Oil, Oxidized - -     8 Wood Tar Mix - -     9 Abietic Acid - -    110 10 Lavendula Angustifolia Oil (Lavender Oil) - -     11 Camphor  NA NA      Fragrance Mix I       12 Oakmoss Absolute - -     13 Eugenol - -     14 Geraniol NA NA    115 15 Hydroxycitronellal - -     16 Isoeugenol - -     17 Cinnamic Aldehyde - -     18 Cinnamic Alcohol  - -      Fragrance mix II       19 Citronellol - -    120 20 Alpha-Hexylcinnamic Aldehyde    - -     21 Citral - -     22 Farnesol - -    123 23 Coumarin - -    HAIRDRESSER        No Substance 2 days 4 days remarks   124 1 P-Phenylenediamin  +++  +++    125 2 P-Toluenediamine Sulphate  -  -     3 Ammonium Thioglycolate - -     4 Ammonium Persulfate - -     5 Resorcinol - -     6 3-Aminophenol - -    130 7 P-Aminophenol - -     8 Glyceryl Monothioglycolate - -     9 Pyrogallol - -    133 10 P-Aminodiphenylamine NA NA    CORTICOSTEROIDS   No Substance 2 days 4 days remarks Allergy  class   134 1 Amcinonide - -  B   135 2 Betametasone-17,21 Dipropionate - -  D1    3 Desoximetasone - -  C    4 Betamethasone-17-Valerate - -  D1    5 Dexamethasone - -  C    6 Hydrocortisone - -  A   140 7 Clobetasol-17-Propionate - -  D1    8 Dexamethasone-21-Phosphate Disodium Salt - -  C    9 Hydrocortisone-17 Butyrate - -  D2    10 Prednisolone - -  A    11 Mometason Furoate NA NA  D1   145 12 Triamcinolone Acetonide - -  B    13 Methylprednisolone Aceponate NA NA  D2    14 Hydrocortisone-21-Acetate - -  A   148 15 Prednicarbate - -  D2     Group Characteristics of group Generic name Name  cross reactions   A Hydrocortisone   Cloprednole, Fludrocortisone acétate, Hydrocortison acetate, Methylprednisolone, Prednisolone, Tixocortolpivalate Alfacortone, Fucidin  H, Dermacalm, Hexacortone, Premandole, Imacort With group D2   B Triamcinolone-acetonide   Budenoside (R-isomer), Amcinonide, Desonide, Fluocinolone acetonide, Triamcinolone acetonide Locapred, Locatop  Synalar, Pevisone, Kenacort -   C Betamethasone (Without Lianet)   Betamethasone, Dexamethasone, Flumethasone pivalate, Halomethasone Daivobet, Dexasalyl, Locasalen,   -   D1 Betamethasone-diproprionate   Betamethasone dipropionate, Betamethasone-17-valerate, Clobetasole-propionate, Fluticasone propionate, Mometasone furoate Betnovate, Diprogenta, Diprosalic, Diprosone, Celestoderm, Fucicort,  Cutivate, Axotide, Elocom -   D2 Methylprednisolone-aceponate   Hydrocortisone-aceponate, Hydrocortisone-buteprate, Hydrocortisone-17-butyrate, Methylprednisolone aceponate, Prednicarbate Locoïd, Advantan,  Prednitop With group A and Budesonide (S-isomer)     OTHER PRODUCTS        No Substance Conc  % solv 2 days 4 days remarks   153 1 Fluocinolone oil As is  - -    154  155 2 Hydrogen Peroxide Salon care 20 30 par - - 154 as is  155 vaseline   156  157 3 fram color futura 30 par ++/++ ++/++ 156 as is  157 vaseline   158  159 4 Chiionic ammonia free color 30 par ++/++ ++/++ 158 as is  159 vaseline     Results of patch tests:                         Interpretation:  - Negative                    A    = Allergic      (+) Erythema    TI   = Toxic/irritant   + E + Infiltration    RaP = Relevance at Present     ++ E/I + Papulovesicle   Rpr  = Relevance Previously     +++ E/I/P + Blister     nR   = No Relevance    Atopy Screen (Placed 05/03/21 )    No Substance Readings (15 min) Evaluation   POS Histamine 1mg/ml ++    NEG NaCl 0.9% -      No Substance Readings (15 min) Evaluation   1 Alternaria alternata (tenuis)  -    2 Cladosporium herbarum -    3 Aspergillus fumigatus -    4 Penicillium notatum -    5 Dermatophagoides pteronyssinus -    6 Dermatophagoides farinae -    7 Dog epithelium (canis spp) -    8 Cat hair (stefany catus) -     9 Cockroach   (Blatella americana & germanica) -    10 Grass mix midwest   (Sarah, Orchard, Redtop, Yusuf) -    11 Paul grass (sorghum halepense) -    12 Weed mix   (common Cocklebur, Lamb s quarters, rough redroot Pigweed, Dock/Sorrel) -    13 Mug wort (artemisia vulgare) -    14 Ragweed giant/short (ambrosia spp) -    15 English Plantain (plantago lanceolata) -    16 Tree mix 1 (Pecan, Maple BHR, Oak RVW, american Jericho, black [place]) ++    17 Red cedar (juniperus virginia) -    18 Tree mix 2   (white Baltazar, river/red Birch, black [place], common [place], american Elm) -    19 Box elder/Maple mix (acer spp) -    20 [place] shagbark (carya ovata) -           Conclusion: 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 Patient is atopic    [] No relevant allergic reaction observed    [x] Allergic reaction diagnosed against following allergens:   +++ PPD and the patients own hair dyes  +/++ Methylisothiazolinone      Interpretation/ remarks:   Patient has a severe allergy to PPD and has to avoid in future all hair dyes containing PPD.   In addition, patient has an allergy to the preservative Methylisothazolinone, that usually replaces the Parabens (patient has NO allergy to Parabens)    [x] Patient information given   [x] ACDS CAMP information's (# [id number], and [id number]) to following  compounds: PPD and Methylisothiazolinone   [] General information's to following compounds: ......      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    ==> Treatment Plan:  >> stop using hair dyes containing PPD  >> avoid products  containing Methylisothiazolinone = follow the CAMP [place] recommendations

## 2021-07-03 DIAGNOSIS — L30.9 DERMATITIS: ICD-10-CM

## 2021-07-03 RX ORDER — PYRITHIONE ZINC 20 MG/ML
SHAMPOO TOPICAL DAILY
Qty: 240 ML | Refills: 5 | Status: SHIPPED | OUTPATIENT
Start: 2021-07-03

## 2021-09-05 ENCOUNTER — HEALTH MAINTENANCE LETTER (OUTPATIENT)
Age: 71
End: 2021-09-05

## 2021-10-04 ENCOUNTER — TELEPHONE (OUTPATIENT)
Dept: DERMATOLOGY | Facility: CLINIC | Age: 71
End: 2021-10-04
Payer: MEDICARE

## 2021-10-04 NOTE — TELEPHONE ENCOUNTER
[facility] Call Center    Phone Message    May a detailed message be left on voicemail: yes     Reason for Call: Appointment Intake    Referring Provider Name:   Pt of Dr Salgado    Diagnosis and/or Symptoms:   HL    Action Taken: Message routed to:  [facility] Derm    Travel Screening: Not Applicable     Pt cancelled 10/4/21 due to Covid exposure.    Pt states that Provider rescheduled several times before 10/4/21.    Pt rescheduled for 1st available 2/25/22.    Is it possible for Pt to be rescheduled sooner? Please call Pt to advise.    Thanks!

## 2021-10-19 DIAGNOSIS — Z00.6 PATIENT IN CLINICAL RESEARCH STUDY: Primary | ICD-10-CM

## 2021-10-25 ENCOUNTER — ALLIED HEALTH/NURSE VISIT (OUTPATIENT)
Dept: DERMATOLOGY | Facility: CLINIC | Age: 71
End: 2021-10-25
Payer: MEDICARE

## 2021-10-25 ENCOUNTER — TELEPHONE (OUTPATIENT)
Dept: DERMATOLOGY | Facility: CLINIC | Age: 71
End: 2021-10-25

## 2021-10-25 DIAGNOSIS — L23.4 ALLERGIC CONTACT DERMATITIS DUE TO DYES: Primary | ICD-10-CM

## 2021-10-25 PROCEDURE — 99207 PR NO CHARGE LOS: CPT

## 2021-10-25 NOTE — PROGRESS NOTES
[facility] Dermato-allergology Note  Research office visit = visit 1  Encounter Date: Oct 25, 2021  ____________________________________________    CC: No chief complaint on file.      HPI:  (10/25/21)  Ms. Susan J Kreye is a(n) 71 year old female who presents today as a return patient for allergy consultation  - Follow-up in Derm-Allergy clinic PPD trial CTSI 66864  - otherwise feeling well in usual state of health    Physical exam:  General: In no acute distress, well-developed, well-nourished  Eyes: no conjunctivitis  ENT: no signs of rhinitis   Pulmonary: no wheezing or coughing  Skin:Focused examination of the skin on test sites was performed = see test results below    Earlier History and Allergy exams:  - Has hair loss, has had rashes and reactions to various treatments.  - 1 liquid hair loss treatment was mixture of minoxidil and something else that she cannot remember, reacted to it last summer. Had rash around face and bumps on back and arms. 1/2 inch around hairline red and itchy. Seemed to be pretty quickly after applying. Red itchy rash resolved within a week of stopping. Bumps on back stayed.  - Fluocinolone oil - could feel itching as soon as she put it on. Similarly, 1/2 in around hair line red and itchy.  - was last summer on a solution of Minoxidil 0.5% and Finasterid 1.7% and reacted to it.    Past Medical History:   Patient Active Problem List   Diagnosis     Closed left ankle fracture     Family history of malignant neoplasm of ovary     Unspecified injury of muscle, fascia and tendon of right hip, sequela     Shoulder tendonitis, right     Abnormal EKG     Depression     Follow-up examination following surgery     Glaucoma     Hearing loss in right ear     Osteopenia     Hyperlipidemia     Past Medical History:   Diagnosis Date     Depression      Glaucoma      Migraines      Osteoporosis      Osteoporosis        Allergy History:     Allergies   Allergen Reactions      Penicillins      Vaginal infections       Social History:   reports that she has never smoked. She has never used smokeless tobacco. She reports current alcohol use. She reports that she does not use drugs.      Family History:  Family History   Problem Relation Age of Onset     Cancer Mother 83        Ovarian     Diabetes Mother         DM II, controlled with diet     Prostate Cancer Mother      Gastrointestinal Disease Sister         Gallbladder     Cancer Father 60        Lung     Prostate Cancer Father      Cancer Maternal Grandmother         Bladder     Prostate Cancer Maternal Grandmother      C.A.D. Paternal Grandmother      Cerebrovascular Disease Paternal Grandfather 88     Prostate Cancer Maternal Grandfather        Medications:  Current Outpatient Medications   Medication Sig Dispense Refill     Biotin 10 MG TABS tablet Take 10,000 mcg by mouth daily       brimonidine-timolol (COMBIGAN) 0.2-0.5 % ophthalmic solution 1 drop 2 times daily.       Calcium Carbonate-Vitamin D (CALCIUM + D PO) Take 1,500 mg by mouth daily.       COMPOUNDED NON-CONTROLLED SUBSTANCE (CMPD RX) - PHARMACY TO MIX COMPOUNDED MEDICATION IDS [id number] DYE KIT CHERELLE ([id number]). Kit containing 1) PTD 1%, 2) ME-PPD 1%, 3) PPD-6 1%, 4) PPD-6 2%, 5) PPD-7 1%, 6) PPD-7 2% 1 each 0     finasteride (PROPECIA) 1 MG tablet Take 1 mg by mouth       fluocinolone acetonide (DERMA SMOOTHE/FS BODY) 0.01 % external oil Apply topically At Bedtime (Patient not taking: Reported on 4/2/2021) 118 mL 5     minoxidil (LONITEN) 2.5 MG tablet Take 2.5 mg by mouth daily       Multiple Vitamin (MULTI-VITAMIN PO) Take 1 tablet by mouth daily.       Pyrithione Zinc (DHS ZINC) 2 % SHAM Externally apply topically daily 240 mL 5     sertraline (ZOLOFT) 50 MG tablet Take 75 mg by mouth daily.         Allergy tests:    Past Allergy tests None    Current Allergy tests (or planned)      Order for PATCH TESTS    [x] Outpatient  [] Inpatient: Valerio..../ Bed ....      Skin  Atopy (atopic dermatitis) [] Yes   [x] No  Contact allergies:   [x] Yes   [] No (allergic to wool, red rash, itchy bumps)  Urticaria/Angioedema  [] Yes   [x] No (in high school, had strep infection on legs)  Rhinitis/Sinusitis:   [x] Yes   [] No   [x] seasonal (early spring)  [] perennial            Allergic Asthma:   [] Yes   [x] No  Pets :  [] Yes   [x] No  Which?...... (Allergic to cats, itchy eyes, runny nose)  Food Allergy:   [x] Yes   [x] No (migraines from ricotta cheese, chocolate, cigarette smoke)  Drug allergies:    [] Yes   [x] No (yeast infections following penicillins)            Reason for tests (suspected allergy): suspected allergy  Known previous allergies: seasonal (early spring), cats, wool intolerance     Standardized panels  [x] Standard panel (40 tests)  [x] Preservatives & Antimicrobials (31 tests)  [x] Emulsifiers & Additives (25 tests)   [x] Perfumes/Flavours & Plants (25 tests)  [x] Hairdresser panel (12 tests)  [] Rubber Chemicals (22 tests)  [] Plastics (26 tests)  [] Colorants/Dyes/Food additives (20 tests)  [] Metals (implants/dental) (24 tests)  [] Local anaesthetics/NSAIDs (14 tests)  [] Antibiotics & Antimycotics (14 tests)   [x] Corticosteroids (15 tests)   [] Photopatch test (62 tests)   [] others: ...      [x] Patient's own products: fluocinolone oil solution and hair dye ...    DO NOT test if chemical or biological identity is unknown!     always ask from patient the product information and safety sheets (MSDS)       Order for PRICK TESTS    Standardized prick panels  [x] Atopic panel (20 tests)  - Pediatric Panel (12 tests)  - Milk, Meat, Eggs, Grains (20 tests)   - Dust, Epithelia, Feathers (10 tests)  - Fish, Seafood, Shellfish (17 tests)  - Nuts, Beans (14 tests)  - Spice, Vegetable, Fruit (17 tests)  - Others: ...      - Patient's own products: ...    DO NOT test if chemical or biological identity is unknown!     always ask from patient the product information and safety  sheets (MSDS)     Standardized intradermal tests  [x] Penicillium notatum [x] Aspergillus fumigatus [x] House dust mites  - Bee venom   - Wasp venom !!Specific protocol with dilutions!!  - Others: ...    RESULTS & EVALUATION of PATCH TESTS    Patch test readings after     [x] 2 days, [] 3 days [x] 4 days, [] 5 days,    Oct 25, 2021 application of patch tests with results:    STANDARD Series        No Substance 2 days 4 days remarks    1 Tacho Mix [C] - -     2 Colophony - -     3  2-Mercaptobenzothiazole  - -      4 Methylisothiazolinone + +/++     5 Carba Mix - -     6 Thiuram Mix [A] - -     7 Bisphenol A Epoxy Resin - -     8 I-Mctc-Bljjikrxeee-Formaldehyde Resin - -     9 Mercapto Mix [A] - -     10 Black Rubber Mix- PPD [B] - -     11 Potassium Dichromate  -  -     12 Balsam of [place] (Myroxylon Pereirae Resin) + -     13 Nickel Sulphate Hexahydrate - -     14 Mixed Dialkyl Thiourea - -     15 Paraben Mix [B] - -     16 Methyldibromo Glutaronitrile - -     17 Fragrance Mix - -     18 2-Bromo-2-Nitropropane-1,3-Diol (Bronopol) NA NA     19 Lyral - -     20 Tixocortol-21- Pivalate - -     21 Diazolidiyl Urea (Germall II) - -     22 Methyl Methacrylate - -     23 Cobalt (II) Chloride Hexahydrate - -     24 Fragrance Mix II  - -     25 Compositae Mix - -     26 Benzoyl Peroxide - -     27 Bacitracin - -     28 Formaldehyde - -     29 Methylchloroisothiazolinone / Methylisothiazolinone - +     30 Corticosteroid Mix NA NA     31 Sodium Lauryl Sulfate - -     32 Lanolin Alcohol - -     33 Turpentine - -     34 Cetylstearylalcohol - -     35 Chlorhexidine Dicluconate - -     36 Budenoside - -     37 Imidazolidinyl Urea  - -     38 Ethyl-2 Cyanoacrylate NA NA     39 Quaternium 15 (Dowicil 200) - -     40 Decyl Glucoside - -    PRESERVATIVES & ANTIMICROBIALS        No Substance 2 days 4 days remarks   41 1  1,2-Benzisothiazoline-3-One, Sodium Salt NA NA     2  1,3,5-Shaun (2-Hydroxyethyl) - Hexahydrotriazine (Den ROPER) - -      3 7-Chklonqcxeuvg-9-Nitro-1, 3-Propanediol - -     4  3, 4, 4' - Triclocarban - -    45 5 4 - Chloro - 3 - Cresol - -     6 4 - Chloro - 4 - Xylenol (PCMX) - -     7 7-Ethylbicyclooxazolidine (Bioban DD0402) - -     8 Benzalkonium Chloride NA NA     9 Benzyl Alcohol - -    50 10 Cetalkonium Chloride - -     11 Cetylpyrimidine Chloride  - -     12 Chloroacetamide - -     13 DMDM Hydantoin - -     14 Glutaraldehyde - -    55 15 Triclosan - -     16 Glyoxal Trimeric Dihydrate - -     17 Iodopropynyl Butylcarbamate - -     18 Octylisothiazoline - -     19 Iodoform NA NA    60 20 (Nitrobutyl) Morpholine/(Ethylnitro-Trimethylene) Dimorpholine (Bioban P 1487) - -     21 Phenoxyethanol - -     22 Phenyl Salicylate NA NA     23 Povidone Iodine - -     24 Sodium Benzoate - -    65 25 Sodium Disulfite - -     26 Sorbic Acid - -     27 Thimerosal       28 Melamine Formaldehyde Resin       29 Ethylenediamine Dihydrochloride        Parabens      70 30 Butyl-P-Hydroxybenzoate - -     31 Ethyl-P-Hydroxybenzoate - -     32 Methyl-P-Hydroxybenzoate - -    73 33 Propyl-P-Hydroxybenzoate - -    EMULSIFIERS & ADDITIVES       No Substance 2 days 4 days remarks   74 1 Polyethylene Glycol-400 NA NA    75 2 Cocamidopropyl Betaine - -     3 Amerchol L101 - -     4 Propylene Glycol - -     5 Triethanolamine - -     6 Sorbitane Sesquiolate - -    80 7 Isopropylmyristate - -     8 Polysorbate 80  - -     9 Amidoamine   (Stearamidopropyl Dimethylamine) NA NA     10 Oleamidopropyl Dimethylamine - -     11 Lauryl Glucoside NA NA    85 12 Coconut Diethanolamide  - -     13 2-Hydroxy-4-Methoxy Benzophenone (Oxybenzone) - -     14 Benzophenone-4 (Sulisobenzon) NA NA     15 Propolis - -     16 Dexpanthenol - -    90 17 Carboxymethyl Cellulose Sodium NA NA     18 Abitol - -     19 Tert-Butylhydroquinone - -     20 Benzyl Salicylate - -      Antioxidant       21 Dodecyl Gallate - -    95 22 Butylhydroxyanisole (BHA) - -     23 Butylhydroxytoluene  (BHT) - -     24 Di-Alpha-Tocopherol (Vit E) - -     25 Propyl Gallate - -     26 Zinc Pyrithione NA NA    100 27 Dimethylaminopropylamin (DMAPA)      PERFUMES, FLAVORS & PLANTS        No Substance 2 days 4 days remarks   101 1 Benzyl Cinnamate - -     2 Di-Limonene (Dipentene) - -     3 Cananga Odorata (Gonzalo Sánchez) (I) - -     4 Lichen Acid Mix - -    105 5 Mentha Piperita Oil (Peppermint Oil) - -     6 Sesquiterpenelactone mix - -     7 Tea Tree Oil, Oxidized - -     8 Wood Tar Mix - -     9 Abietic Acid - -    110 10 Lavendula Angustifolia Oil (Lavender Oil) - -     11 Camphor  NA NA      Fragrance Mix I       12 Oakmoss Absolute - -     13 Eugenol - -     14 Geraniol NA NA    115 15 Hydroxycitronellal - -     16 Isoeugenol - -     17 Cinnamic Aldehyde - -     18 Cinnamic Alcohol  - -      Fragrance mix II       19 Citronellol - -    120 20 Alpha-Hexylcinnamic Aldehyde    - -     21 Citral - -     22 Farnesol - -    123 23 Coumarin - -    HAIRDRESSER        No Substance 2 days 4 days remarks   124 1 P-Phenylenediamin  +++  +++    125 2 P-Toluenediamine Sulphate  -  -     3 Ammonium Thioglycolate - -     4 Ammonium Persulfate - -     5 Resorcinol - -     6 3-Aminophenol - -    130 7 P-Aminophenol - -     8 Glyceryl Monothioglycolate - -     9 Pyrogallol - -    133 10 P-Aminodiphenylamine NA NA    CORTICOSTEROIDS   No Substance 2 days 4 days remarks Allergy  class   134 1 Amcinonide - -  B   135 2 Betametasone-17,21 Dipropionate - -  D1    3 Desoximetasone - -  C    4 Betamethasone-17-Valerate - -  D1    5 Dexamethasone - -  C    6 Hydrocortisone - -  A   140 7 Clobetasol-17-Propionate - -  D1    8 Dexamethasone-21-Phosphate Disodium Salt - -  C    9 Hydrocortisone-17 Butyrate - -  D2    10 Prednisolone - -  A    11 Mometason Furoate NA NA  D1   145 12 Triamcinolone Acetonide - -  B    13 Methylprednisolone Aceponate NA NA  D2    14 Hydrocortisone-21-Acetate - -  A   148 15 Prednicarbate - -  D2     Group  Characteristics of group Generic name Name  cross reactions   A Hydrocortisone   Cloprednole, Fludrocortisone acétate, Hydrocortison acetate, Methylprednisolone, Prednisolone, Tixocortolpivalate Alfacortone, Fucidin H, Dermacalm, Hexacortone, Premandole, Imacort With group D2   B Triamcinolone-acetonide   Budenoside (R-isomer), Amcinonide, Desonide, Fluocinolone acetonide, Triamcinolone acetonide Locapred, Locatop  Synalar, Pevisone, Kenacort -   C Betamethasone (Without Lianet)   Betamethasone, Dexamethasone, Flumethasone pivalate, Halomethasone Daivobet, Dexasalyl, Locasalen,   -   D1 Betamethasone-diproprionate   Betamethasone dipropionate, Betamethasone-17-valerate, Clobetasole-propionate, Fluticasone propionate, Mometasone furoate Betnovate, Diprogenta, Diprosalic, Diprosone, Celestoderm, Fucicort,  Cutivate, Axotide, Elocom -   D2 Methylprednisolone-aceponate   Hydrocortisone-aceponate, Hydrocortisone-buteprate, Hydrocortisone-17-butyrate, Methylprednisolone aceponate, Prednicarbate Locoïd, Advantan,  Prednitop With group A and Budesonide (S-isomer)     OTHER PRODUCTS        No Substance Conc  % solv 2 days 4 days remarks   153 1 Fluocinolone oil As is  - -    154  155 2 Hydrogen Peroxide Salon care 20 30 par - - 154 as is  155 vaseline   156  157 3 fram color futura 30 par ++/++ ++/++ 156 as is  157 vaseline   158  159 4 Chiionic ammonia free color 30 par ++/++ ++/++ 158 as is  159 vaseline     Results of patch tests:                         Interpretation:  - Negative                    A    = Allergic      (+) Erythema    TI   = Toxic/irritant   + E + Infiltration    RaP = Relevance at Present     ++ E/I + Papulovesicle   Rpr  = Relevance Previously     +++ E/I/P + Blister     nR   = No Relevance    Atopy Screen (Placed 05/03/21 )    No Substance Readings (15 min) Evaluation   POS Histamine 1mg/ml ++    NEG NaCl 0.9% -      No Substance Readings (15 min) Evaluation   1 Alternaria alternata (tenuis)  -    2  Cladosporium herbarum -    3 Aspergillus fumigatus -    4 Penicillium notatum -    5 Dermatophagoides pteronyssinus -    6 Dermatophagoides farinae -    7 Dog epithelium (canis spp) -    8 Cat hair (stefany catus) -    9 Cockroach   (Blatella americana & germanica) -    10 Grass mix midwest   (Sarah, Orchard, Redtop, Yusuf) -    11 Paul grass (sorghum halepense) -    12 Weed mix   (common Cocklebur, Lamb s quarters, rough redroot Pigweed, Dock/Sorrel) -    13 Mug wort (artemisia vulgare) -    14 Ragweed giant/short (ambrosia spp) -    15 English Plantain (plantago lanceolata) -    16 Tree mix 1 (Pecan, Maple BHR, Oak RVW, american [place], black [place]) ++    17 Red cedar (juniperus virginia) -    18 Tree mix 2   (white [place]tazar, river/red Birch, black [place], common [place], american Elm) -    19 Box elder/Maple mix (acer spp) -    20 [place] shagbark (carya ovata) -           Conclusion: 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 Patient is atopic    [] No relevant allergic reaction observed    [x] Allergic reaction diagnosed against following allergens:   +++ PPD and the patients own hair dyes  +/++ Methylisothiazolinone      Interpretation/ remarks:   Patient has a severe allergy to PPD and has to avoid in future all hair dyes containing PPD.   In addition, patient has an allergy to the preservative Methylisothazolinone, that usually replaces the Parabens (patient has NO allergy to Parabens)    [x] Patient information given   [x] ACDS CAMP information's (# [id number], and [id number]) to following  compounds: PPD and Methylisothiazolinone   [] General information's to following compounds: ......    Patch tests for PPD study CTSI 65123 10/25/2021        # Substance Conc  % solv 2 days 4 days remarks    1 PPD SmartPract 1% vas   Not teste    2            3 PPD 6 1% vas       4 PPD 7 1% vas       5          6 PTD         7  1% vas       8 PPD 6 2% vas       9 PPD 7 2% vas       10  ME-PPD 1% vas      >> PPD not tested. 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    ==> Treatment Plan:  >> stop using hair dyes containing PPD  >> avoid products containing Methylisothiazolinone = follow the [employer] recommendations    Referred by: Dr. Sadaf Salgado    Staff: provider        Procedures Performed: Allergy tests, including patch tests    Staff: : provider      Follow-up in Derm-Allergy clinic on Wednesday  ___________________________      I spent a total of 20 minutes with Susan J Kreye during today s  visit. This time was spent discussing all the individual test results, correlating them to the clinical relevance, counseling the patient and/or coordinating care and performing allergy tests or procedures.

## 2021-10-25 NOTE — TELEPHONE ENCOUNTER
"[facility] Call Center    Phone Message    May a detailed message be left on voicemail: yes     Reason for Call: Appointment Intake    Referring Provider Name:   Parag Research Coordinator MELANIA    Diagnosis and/or Symptoms:   PCD Hair Dye study, today, 10/25/21 at 10:30    Action Taken: Message routed to:  [facility] Derm    Travel Screening: Not Applicable     No reply at clinic backline or Vocera attime of call.    Scheduling protocols show \"Only schedule if directed to by a Research Coordinator. Schedule with provider [facility] DERM RESEARCH COORDINATOR.  The  will tell you which visit type to use\"    Caller unable to provide this to writer    Research Coordinator requesting Derm appt with Dr Harris for this Pt. David noted that Dr Harris is in Allergy.    Parag provided:  Department:[facility]  Provider: [facility] Research Coordinator    Visit type: no correct visit type provided    Caller needs to schedule this Pt for research appt right away for 10/25/21 at 10:30am.     Please return her call to schedule.                                                                                  "

## 2021-10-29 ENCOUNTER — ALLIED HEALTH/NURSE VISIT (OUTPATIENT)
Dept: DERMATOLOGY | Facility: CLINIC | Age: 71
End: 2021-10-29
Payer: MEDICARE

## 2021-10-29 DIAGNOSIS — L23.4 ALLERGIC CONTACT DERMATITIS DUE TO DYES: Primary | ICD-10-CM

## 2021-10-29 PROCEDURE — 99207 PR NO CHARGE LOS: CPT

## 2021-10-29 NOTE — PATIENT INSTRUCTIONS
Assessment & Plan:     ==> Final Diagnosis:      # Proven allergic contact sensitization   ? Severely to hair dye PPD  ? to preservative Methylisothiazolinone  * chronic with acute exacerbation     These conclusions are made at the best of one's knowledge and belief based on the provided evidence such as patient's history and allergy test results and they can change over time or can be incomplete because of missing information's.     ==> Treatment Plan:  >> stop using hair dyes containing PPD  >> avoid products containing Methylisothiazolinone = follow the [facility] [place] recommendations     Based on the patch tests during the studies, patient did NOT react to the PPD products from the trial (PPD 6 and PPD7), but also no reaction to the toluene-2,5-diamine sulfate PTD and 2-methoxymethyl-PPD (ME-PPD), which should be commercially available (Clairol)

## 2021-10-29 NOTE — PROGRESS NOTES
[facility] Dermato-allergology Note  Office visit  Encounter Date: Oct 29, 2021  ____________________________________________    CC: No chief complaint on file.      HPI:  (10/29/21)  Ms. Susan J Kreye is a(n) 71 year old female who presents today as a return patient for allergy consultation  - patient here for day 4 of the trial patch test readings  - otherwise feeling well in usual state of health    Physical exam:  General: In no acute distress, well-developed, well-nourished  Eyes: no conjunctivitis  ENT: no signs of rhinitis   Pulmonary: no wheezing or coughing  Skin:Focused examination of the skin on test sites was performed = see test results below    Earlier History and Allergy exams:  - Has hair loss, has had rashes and reactions to various treatments.  - 1 liquid hair loss treatment was mixture of minoxidil and something else that she cannot remember, reacted to it last summer. Had rash around face and bumps on back and arms. 1/2 inch around hairline red and itchy. Seemed to be pretty quickly after applying. Red itchy rash resolved within a week of stopping. Bumps on back stayed.  - Fluocinolone oil - could feel itching as soon as she put it on. Similarly, 1/2 in around hair line red and itchy.  - was last summer on a solution of Minoxidil 0.5% and Finasterid 1.7% and reacted to it.    Past Medical History:   Patient Active Problem List   Diagnosis     Closed left ankle fracture     Family history of malignant neoplasm of ovary     Unspecified injury of muscle, fascia and tendon of right hip, sequela     Shoulder tendonitis, right     Abnormal EKG     Depression     Follow-up examination following surgery     Glaucoma     Hearing loss in right ear     Osteopenia     Hyperlipidemia     Past Medical History:   Diagnosis Date     Depression      Glaucoma      Migraines      Osteoporosis      Osteoporosis        Allergy History:     Allergies   Allergen Reactions     Penicillins       Vaginal infections       Social History:   reports that she has never smoked. She has never used smokeless tobacco. She reports current alcohol use. She reports that she does not use drugs.      Family History:  Family History   Problem Relation Age of Onset     Cancer Mother 83        Ovarian     Diabetes Mother         DM II, controlled with diet     Prostate Cancer Mother      Gastrointestinal Disease Sister         Gallbladder     Cancer Father 60        Lung     Prostate Cancer Father      Cancer Maternal Grandmother         Bladder     Prostate Cancer Maternal Grandmother      C.A.D. Paternal Grandmother      Cerebrovascular Disease Paternal Grandfather 88     Prostate Cancer Maternal Grandfather        Medications:  Current Outpatient Medications   Medication Sig Dispense Refill     Biotin 10 MG TABS tablet Take 10,000 mcg by mouth daily       brimonidine-timolol (COMBIGAN) 0.2-0.5 % ophthalmic solution 1 drop 2 times daily.       Calcium Carbonate-Vitamin D (CALCIUM + D PO) Take 1,500 mg by mouth daily.       COMPOUNDED NON-CONTROLLED SUBSTANCE (CMPD RX) - PHARMACY TO MIX COMPOUNDED MEDICATION IDS [id number] DYE KIT CHERELLE ([id number]). Kit containing 1) PTD 1%, 2) ME-PPD 1%, 3) PPD-6 1%, 4) PPD-6 2%, 5) PPD-7 1%, 6) PPD-7 2% 1 each 0     finasteride (PROPECIA) 1 MG tablet Take 1 mg by mouth       fluocinolone acetonide (DERMA SMOOTHE/FS BODY) 0.01 % external oil Apply topically At Bedtime (Patient not taking: Reported on 4/2/2021) 118 mL 5     minoxidil (LONITEN) 2.5 MG tablet Take 2.5 mg by mouth daily       Multiple Vitamin (MULTI-VITAMIN PO) Take 1 tablet by mouth daily.       Pyrithione Zinc (DHS ZINC) 2 % SHAM Externally apply topically daily 240 mL 5     sertraline (ZOLOFT) 50 MG tablet Take 75 mg by mouth daily.         Allergy tests:    Past Allergy tests None    Current Allergy tests (or planned)      Order for PATCH TESTS    [x] Outpatient  [] Inpatient: Valerio..../ Bed ....      Skin Atopy (atopic  dermatitis) [] Yes   [x] No  Contact allergies:   [x] Yes   [] No (allergic to wool, red rash, itchy bumps)  Urticaria/Angioedema  [] Yes   [x] No (in high school, had strep infection on legs)  Rhinitis/Sinusitis:   [x] Yes   [] No   [x] seasonal (early spring)  [] perennial            Allergic Asthma:   [] Yes   [x] No  Pets :  [] Yes   [x] No  Which?...... (Allergic to cats, itchy eyes, runny nose)  Food Allergy:   [x] Yes   [x] No (migraines from ricotta cheese, chocolate, cigarette smoke)  Drug allergies:    [] Yes   [x] No (yeast infections following penicillins)            Reason for tests (suspected allergy): suspected allergy  Known previous allergies: seasonal (early spring), cats, wool intolerance     Standardized panels  [x] Standard panel (40 tests)  [x] Preservatives & Antimicrobials (31 tests)  [x] Emulsifiers & Additives (25 tests)   [x] Perfumes/Flavours & Plants (25 tests)  [x] Hairdresser panel (12 tests)  [] Rubber Chemicals (22 tests)  [] Plastics (26 tests)  [] Colorants/Dyes/Food additives (20 tests)  [] Metals (implants/dental) (24 tests)  [] Local anaesthetics/NSAIDs (14 tests)  [] Antibiotics & Antimycotics (14 tests)   [x] Corticosteroids (15 tests)   [] Photopatch test (62 tests)   [] others: ...      [x] Patient's own products: fluocinolone oil solution and hair dye ...    DO NOT test if chemical or biological identity is unknown!     always ask from patient the product information and safety sheets (MSDS)       Order for PRICK TESTS    Standardized prick panels  [x] Atopic panel (20 tests)  - Pediatric Panel (12 tests)  - Milk, Meat, Eggs, Grains (20 tests)   - Dust, Epithelia, Feathers (10 tests)  - Fish, Seafood, Shellfish (17 tests)  - Nuts, Beans (14 tests)  - Spice, Vegetable, Fruit (17 tests)  - Others: ...      - Patient's own products: ...    DO NOT test if chemical or biological identity is unknown!     always ask from patient the product information and safety sheets (MSDS)      Standardized intradermal tests  [x] Penicillium notatum [x] Aspergillus fumigatus [x] House dust mites  - Bee venom   - Wasp venom !!Specific protocol with dilutions!!  - Others: ...    RESULTS & EVALUATION of PATCH TESTS    Patch test readings after     [x] 2 days, [] 3 days [x] 4 days, [] 5 days,    Oct 29, 2021 application of patch tests with results:    STANDARD Series        No Substance 2 days 4 days remarks    1 Tacho Mix [C] - -     2 Colophony - -     3  2-Mercaptobenzothiazole  - -      4 Methylisothiazolinone + +/++     5 Carba Mix - -     6 Thiuram Mix [A] - -     7 Bisphenol A Epoxy Resin - -     8 D-Dezl-Dmjroighhcc-Formaldehyde Resin - -     9 Mercapto Mix [A] - -     10 Black Rubber Mix- PPD [B] - -     11 Potassium Dichromate  -  -     12 Balsam of [place] (Myroxylon Pereirae Resin) + -     13 Nickel Sulphate Hexahydrate - -     14 Mixed Dialkyl Thiourea - -     15 Paraben Mix [B] - -     16 Methyldibromo Glutaronitrile - -     17 Fragrance Mix - -     18 2-Bromo-2-Nitropropane-1,3-Diol (Bronopol) NA NA     19 Lyral - -     20 Tixocortol-21- Pivalate - -     21 Diazolidiyl Urea (Germall II) - -     22 Methyl Methacrylate - -     23 Cobalt (II) Chloride Hexahydrate - -     24 Fragrance Mix II  - -     25 Compositae Mix - -     26 Benzoyl Peroxide - -     27 Bacitracin - -     28 Formaldehyde - -     29 Methylchloroisothiazolinone / Methylisothiazolinone - +     30 Corticosteroid Mix NA NA     31 Sodium Lauryl Sulfate - -     32 Lanolin Alcohol - -     33 Turpentine - -     34 Cetylstearylalcohol - -     35 Chlorhexidine Dicluconate - -     36 Budenoside - -     37 Imidazolidinyl Urea  - -     38 Ethyl-2 Cyanoacrylate NA NA     39 Quaternium 15 (Dowicil 200) - -     40 Decyl Glucoside - -    PRESERVATIVES & ANTIMICROBIALS        No Substance 2 days 4 days remarks   41 1  1,2-Benzisothiazoline-3-One, Sodium Salt NA NA     2  1,3,5-Shaun (2-Hydroxyethyl) - Hexahydrotriazine (Grotan BK) - -     3  4-Pndzcxaavfvkk-7-Nitro-1, 3-Propanediol - -     4  3, 4, 4' - Triclocarban - -    45 5 4 - Chloro - 3 - Cresol - -     6 4 - Chloro - 4 - Xylenol (PCMX) - -     7 7-Ethylbicyclooxazolidine (Bioban [id number]) - -     8 Benzalkonium Chloride NA NA     9 Benzyl Alcohol - -    50 10 Cetalkonium Chloride - -     11 Cetylpyrimidine Chloride  - -     12 Chloroacetamide - -     13 DMDM Hydantoin - -     14 Glutaraldehyde - -    55 15 Triclosan - -     16 Glyoxal Trimeric Dihydrate - -     17 Iodopropynyl Butylcarbamate - -     18 Octylisothiazoline - -     19 Iodoform NA NA    60 20 (Nitrobutyl) Morpholine/(Ethylnitro-Trimethylene) Dimorpholine (Bioban P 1487) - -     21 Phenoxyethanol - -     22 Phenyl Salicylate NA NA     23 Povidone Iodine - -     24 Sodium Benzoate - -    65 25 Sodium Disulfite - -     26 Sorbic Acid - -     27 Thimerosal       28 Melamine Formaldehyde Resin       29 Ethylenediamine Dihydrochloride        Parabens      70 30 Butyl-P-Hydroxybenzoate - -     31 Ethyl-P-Hydroxybenzoate - -     32 Methyl-P-Hydroxybenzoate - -    73 33 Propyl-P-Hydroxybenzoate - -    EMULSIFIERS & ADDITIVES       No Substance 2 days 4 days remarks   74 1 Polyethylene Glycol-400 NA NA    75 2 Cocamidopropyl Betaine - -     3 Amerchol L101 - -     4 Propylene Glycol - -     5 Triethanolamine - -     6 Sorbitane Sesquiolate - -    80 7 Isopropylmyristate - -     8 Polysorbate 80  - -     9 Amidoamine   (Stearamidopropyl Dimethylamine) NA NA     10 Oleamidopropyl Dimethylamine - -     11 Lauryl Glucoside NA NA    85 12 Coconut Diethanolamide  - -     13 2-Hydroxy-4-Methoxy Benzophenone (Oxybenzone) - -     14 Benzophenone-4 (Sulisobenzon) NA NA     15 Propolis - -     16 Dexpanthenol - -    90 17 Carboxymethyl Cellulose Sodium NA NA     18 Abitol - -     19 Tert-Butylhydroquinone - -     20 Benzyl Salicylate - -      Antioxidant       21 Dodecyl Gallate - -    95 22 Butylhydroxyanisole (BHA) - -     23 Butylhydroxytoluene (BHT) -  -     24 Di-Alpha-Tocopherol (Vit E) - -     25 Propyl Gallate - -     26 Zinc Pyrithione NA NA    100 27 Dimethylaminopropylamin (DMAPA)      PERFUMES, FLAVORS & PLANTS        No Substance 2 days 4 days remarks   101 1 Benzyl Cinnamate - -     2 Di-Limonene (Dipentene) - -     3 Cananga Odorata (Gonzalo Sánchez) (I) - -     4 Lichen Acid Mix - -    105 5 Mentha Piperita Oil (Peppermint Oil) - -     6 Sesquiterpenelactone mix - -     7 Tea Tree Oil, Oxidized - -     8 Wood Tar Mix - -     9 Abietic Acid - -    110 10 Lavendula Angustifolia Oil (Lavender Oil) - -     11 Camphor  NA NA      Fragrance Mix I       12 Oakmoss Absolute - -     13 Eugenol - -     14 Geraniol NA NA    115 15 Hydroxycitronellal - -     16 Isoeugenol - -     17 Cinnamic Aldehyde - -     18 Cinnamic Alcohol  - -      Fragrance mix II       19 Citronellol - -    120 20 Alpha-Hexylcinnamic Aldehyde    - -     21 Citral - -     22 Farnesol - -    123 23 Coumarin - -    HAIRDRESSER        No Substance 2 days 4 days remarks   124 1 P-Phenylenediamin  +++  +++    125 2 P-Toluenediamine Sulphate  -  -     3 Ammonium Thioglycolate - -     4 Ammonium Persulfate - -     5 Resorcinol - -     6 3-Aminophenol - -    130 7 P-Aminophenol - -     8 Glyceryl Monothioglycolate - -     9 Pyrogallol - -    133 10 P-Aminodiphenylamine NA NA    CORTICOSTEROIDS   No Substance 2 days 4 days remarks Allergy  class   134 1 Amcinonide - -  B   135 2 Betametasone-17,21 Dipropionate - -  D1    3 Desoximetasone - -  C    4 Betamethasone-17-Valerate - -  D1    5 Dexamethasone - -  C    6 Hydrocortisone - -  A   140 7 Clobetasol-17-Propionate - -  D1    8 Dexamethasone-21-Phosphate Disodium Salt - -  C    9 Hydrocortisone-17 Butyrate - -  D2    10 Prednisolone - -  A    11 Mometason Furoate NA NA  D1   145 12 Triamcinolone Acetonide - -  B    13 Methylprednisolone Aceponate NA NA  D2    14 Hydrocortisone-21-Acetate - -  A   148 15 Prednicarbate - -  D2     Group Characteristics  of group Generic name Name  cross reactions   A Hydrocortisone   Cloprednole, Fludrocortisone acétate, Hydrocortison acetate, Methylprednisolone, Prednisolone, Tixocortolpivalate Alfacortone, Fucidin H, Dermacalm, Hexacortone, Premandole, Imacort With group D2   B Triamcinolone-acetonide   Budenoside (R-isomer), Amcinonide, Desonide, Fluocinolone acetonide, Triamcinolone acetonide Locapred, Locatop  Synalar, Pevisone, Kenacort -   C Betamethasone (Without Lianet)   Betamethasone, Dexamethasone, Flumethasone pivalate, Halomethasone Daivobet, Dexasalyl, Locasalen,   -   D1 Betamethasone-diproprionate   Betamethasone dipropionate, Betamethasone-17-valerate, Clobetasole-propionate, Fluticasone propionate, Mometasone furoate Betnovate, Diprogenta, Diprosalic, Diprosone, Celestoderm, Fucicort,  Cutivate, Axotide, Elocom -   D2 Methylprednisolone-aceponate   Hydrocortisone-aceponate, Hydrocortisone-buteprate, Hydrocortisone-17-butyrate, Methylprednisolone aceponate, Prednicarbate Locoïd, Advantan,  Prednitop With group A and Budesonide (S-isomer)     OTHER PRODUCTS        No Substance Conc  % solv 2 days 4 days remarks   153 1 Fluocinolone oil As is  - -    154  155 2 Hydrogen Peroxide Salon care 20 30 par - - 154 as is  155 vaseline   156  157 3 fram color futura 30 par ++/++ ++/++ 156 as is  157 vaseline   158  159 4 Chiionic ammonia free color 30 par ++/++ ++/++ 158 as is  159 vaseline     Results of patch tests:                         Interpretation:  - Negative                    A    = Allergic      (+) Erythema    TI   = Toxic/irritant   + E + Infiltration    RaP = Relevance at Present     ++ E/I + Papulovesicle   Rpr  = Relevance Previously     +++ E/I/P + Blister     nR   = No Relevance    Atopy Screen (Placed 05/03/21 )    No Substance Readings (15 min) Evaluation   POS Histamine 1mg/ml ++    NEG NaCl 0.9% -      No Substance Readings (15 min) Evaluation   1 Alternaria alternata (tenuis)  -    2 Cladosporium  herbarum -    3 Aspergillus fumigatus -    4 Penicillium notatum -    5 Dermatophagoides pteronyssinus -    6 Dermatophagoides farinae -    7 Dog epithelium (canis spp) -    8 Cat hair (stefany catus) -    9 Cockroach   (Blatella americana & germanica) -    10 Grass mix midwest   (Sarah, Orchard, Redtop, Yusuf) -    11 Paul grass (sorghum halepense) -    12 Weed mix   (common Cocklebur, Lamb s quarters, rough redroot Pigweed, Dock/Sorrel) -    13 Mug wort (artemisia vulgare) -    14 Ragweed giant/short (ambrosia spp) -    15 English Plantain (plantago lanceolata) -    16 Tree mix 1 (Pecan, Maple BHR, Oak RVW, american Cannon, black [place]) ++    17 Red cedar (juniperus virginia) -    18 Tree mix 2   (white Baltazar, river/red Birch, black Randolph, common Pettis, american Elm) -    19 Box elder/Maple mix (acer spp) -    20 [place] shagbark (carya ovata) -           Conclusion: 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 Patient is atopic    [] No relevant allergic reaction observed    [x] Allergic reaction diagnosed against following allergens:   +++ PPD and the patients own hair dyes  +/++ Methylisothiazolinone      Interpretation/ remarks:   Patient has a severe allergy to PPD and has to avoid in future all hair dyes containing PPD.   In addition, patient has an allergy to the preservative Methylisothazolinone, that usually replaces the Parabens (patient has NO allergy to Parabens)    [x] Patient information given   [x] ACDS CAMP information's (# [id number], and [id number]) to following  compounds: PPD and Methylisothiazolinone   [] General information's to following compounds: ......    Patch tests for PPD study CTSI [id number] 10/25/2021        # Substance Conc  % solv 2 days 4 days remarks    1 PPD SmartPract 1% vas   Not tested    2 Vaseline control     - -     3 PPD 6 1% vas - -     4 PPD 7 1% vas - -     5          6 PTD         7  1% vas - -     8 PPD 6 2% vas - -     9 PPD 7 2% vas  - -     10 ME-PPD 1% vas - -    >> PPD not tested. 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    ==> Treatment Plan:  >> stop using hair dyes containing PPD  >> avoid products containing Methylisothiazolinone = follow the CAMP [place] recommendations    Based on the patch tests during the studies, patient did NOT react to the PPD products from the trial (PPD 6 and PPD7), but also no reaction to the toluene-2,5-diamine sulfate PTD and 2-methoxymethyl-PPD (ME-PPD), which should be commercially available (Clairol, ME+ color revolution)    Referred by: Dr. Sadaf Salgado    Staff: provider        Staff: : provider      Follow-up in Derm-Allergy clinic if necessary  ________________________    Not to bill = trial patient

## 2022-04-17 ENCOUNTER — HEALTH MAINTENANCE LETTER (OUTPATIENT)
Age: 72
End: 2022-04-17

## 2022-07-13 NOTE — TELEPHONE ENCOUNTER
I called and left a VM asking for Cheryl to give us a call back. Cheryl has been accepted into the hair loss clinic and can schedule at the next opening for New hair loss.    NARINDER Ortega    
I called and spoke with Cheryl and informed her that we have received her referral. I also went over the referral process with her and told her she should hear back within 6 weeks.    NARINDER Ortega    
[facility] Call Center    Phone Message    May a detailed message be left on voicemail: yes     Reason for Call: Other: Pt needs review for Hair Loss. Recs Incoming.     Action Taken: Message routed to:  [facility] Derm    Travel Screening: Not Applicable                                                                      
PERRL/EOMI/conjunctiva clear/normal

## 2022-09-02 NOTE — PROGRESS NOTES
10/26/2017    Referring Provider: Sharri Real MD    Presenting Information:   I met with Susan Kreye today for genetic counseling at the [facility] at the [facility] to discuss her family history of ovarian cancer.  She is here today to review this history, cancer screening recommendations, and available genetic testing options.    Personal History:  Cheryl is a 67 year old female.  She does not have any personal history of cancer.      She had her first menstrual period at age 11, her first child at age 35, and went through menopause in her mid 50's.  Cheryl has her ovaries, fallopian tubes and uterus in place, and she has had annual ovarian cancer screening including CA-125 and transvaginal ultrasounds (most recent in April of 2017 were normal).  She reports a history of estrogen hormone replacement therapy for 5-10 years.      Cheryl's most recent OB-GYN exam and Pap smear in 2017 were normal.  She has annual clinical breast exams and mammograms, and her most recent mammogram in March of 2017 was benign.  Her most recent colonoscopy reportedly within the past 5 years was normal, and follow-up was recommended in 10 years.  She reports no history of colon polyps. Cheryl reports seeing a dermatologist annually, and has a history of one benign mole.  Cheryl reported no tobacco use.    Family History: (Please see scanned pedigree for detailed family history information)    Cheryl's daughter was diagnosed with papillary thyroid cancer recently at age 32    Her brother was diagnosed with prostate cancer at 54 and is now 58    Her mother was diagnosed with ovarian cancer at age 82 and passed away at 87    Her maternal grandmother was diagnosed with reportedly stomach cancer in her 30's and passed away at 96    Her maternal grandfather was diagnosed with prostate cancer in his 90's and has passed away    Cheryl's father had a history of lung cancer and passed away at 60.  He had  significant exposure to smoking and asbestos.      Her maternal ethnicity is [place] and possibly Ashkenazi Christianity. Her paternal ethnicity is [place] and [place].     Discussion:    Cheryl's family history of ovarian cancer may be suggestive of a possible cancer susceptibility gene in the family. Given her mother's history of ovarian cancer, we discussed Taveras syndrome and Hereditary Breast and Ovarian Cancer syndrome as the two most likely hereditary explanations.    We reviewed the features of sporadic, familial, and hereditary cancers.  Based on her family history, Cheryl meets current National Comprehensive Cancer Network (NCCN) criteria for genetic testing of BRCA1 and BRCA2.    We discussed the natural history and genetics of Hereditary Breast and Ovarian cancer syndrome due to BRCA1 and BRCA2 gene mutations. We also discussed the natural history and genetics of Taveras syndrome due to mismatch repair gene mutations (MLH1, MSH2, MSH6, PMS2, EPCAM). A detailed handout regarding hereditary gynecologic cancer risk genes and the information we discussed was provided to Cheryl at the end of our appointment today and can be found in the after visit summary.  Topics included: inheritance pattern, cancer risks, cancer screening recommendations, and also risks, benefits and limitations of testing.    We discussed that there are additional genes that could cause increased risk for ovarian cancer.  As many of these genes present with overlapping features in a family and accurate cancer risk cannot always be established based upon the pedigree analysis alone, it would be reasonable for Cheryl to consider panel genetic testing to analyze multiple genes at once.    We discussed that genetic testing for ovarian cancer susceptibility genes is typically most informative when it is first performed on a family member with a personal history of ovarian or a related cancer. Testing is available to Cheryl, but with limitations. If Cheryl  pursues testing at this time and receives a negative result, this does not rule out the possibility of a hereditary cancer syndrome in her and/or her family. Despite these limitations, Cheryl expressed interest in proceeding with testing.    We reviewed genetic testing options for hereditary gynecologic cancers: actionable high/moderate risk panel testing (GYNplus, 13 genes) and expanded high and moderate risk panel testing (OvaNext, 25 genes).  Cheryl expressed an interest in learning as much information as possible from the testing. She opted for the OvaNext panel.  Genetic testing is available for 25 genes associated with hereditary gynecologic cancers: OvaNext (ASHLEY, BARD1, BRCA1, BRCA2, BRIP1, CDH1, CHEK2, DICER1, EPCAM, MLH1, MRE11A, MSH2, MSH6, MUTYH, NBN, NF1, PALB2, PMS2, PTEN, RAD50, RAD51C, RAD51D, SMARCA4, STK11, and TP53).  We discussed that some of the genes in the OvaNext panel are associated with specific hereditary cancer syndromes and have published management guidelines: Hereditary Breast and Ovarian Cancer syndrome (BRCA1, BRCA2), Taveras syndrome (MLH1, MSH2, MSH6, PMS2, EPCAM), Hereditary Diffuse Gastric Cancer (CDH1), Cowden syndrome (PTEN), Li Fraumeni syndrome (TP53), Peutz-Jeghers syndrome (STK11), MUTYH Associated Polyposis syndrome (MUTYH), and Neurofibromatosis type 1 (NF1).    Risk-reducing salpingo-oophorectomy can be considered in women with mutations in BRIP1, RAD51C, or RAD51D.  Breast cancer risk management guidelines are available for ASHLEY, CHEK2, PALB2, NF1, and NBN.  The remaining genes (BARD1, DICER1, MRE11A, RAD50, and SMARCA4) are associated with increased cancer risk and may allow us to make medical recommendations when mutations are identified.    Cheryl was provided with a detailed brochure from [facility] explaining the OvaNext testing.  Consent was obtained and genetic testing for OvaNext was sent to [facility]. Turn around time: approximately 3-4  weeks.    Medical Management: For Cheryl, we reviewed that the information from genetic testing may determine:    additional cancer screening for which Cheryl may qualify (i.e. mammogram and breast MRI, more frequent colonoscopies, more frequent dermatologic exams, etc.),    options for risk reducing surgeries Cheryl could consider (i.e. bilateral mastectomy, surgery to remove the ovaries and/or uterus, etc.)      These recommendations will be discussed in detail once genetic testing is completed.     Plan:  1) Today Cheryl elected to proceed with the OvaNext panel offered through [employer] [facility].  2) This information should be available in 3-4 weeks.  3) Cheryl will return to clinic to discuss the results    Face to face time: 45 minutes    Suellen Moss MS, [facility]  Certified Genetic Counselor  [phone]               right lower extremity

## 2022-10-23 ENCOUNTER — HEALTH MAINTENANCE LETTER (OUTPATIENT)
Age: 72
End: 2022-10-23

## 2023-01-16 NOTE — TELEPHONE ENCOUNTER
Prior Authorization Approval    Medication: fluocinolone acetonide (DERMA SMOOTHE/FS BODY) 0.01 % oil - APPROVED was approved on 2/23/2021  Effective:   to    Reference #:    Approved Dose/Quantity:   Insurance Company: [employer] - Phone [phone] Fax [phone]  Expected CoPay:    Pharmacy Filling the Rx: [id number] - [place], [place] - [address]  Pharmacy Notified: Yes  Patient Notified: Comment:  **Instructed pharmacy to notify patient when script is ready to /ship.**     Cyclophosphamide Counseling:  I discussed with the patient the risks of cyclophosphamide including but not limited to hair loss, hormonal abnormalities, decreased fertility, abdominal pain, diarrhea, nausea and vomiting, bone marrow suppression and infection. The patient understands that monitoring is required while taking this medication.

## 2023-11-05 ENCOUNTER — HEALTH MAINTENANCE LETTER (OUTPATIENT)
Age: 73
End: 2023-11-05

## 2024-04-11 DIAGNOSIS — Z00.6 EXAMINATION OF PARTICIPANT IN CLINICAL TRIAL: ICD-10-CM

## 2024-04-11 DIAGNOSIS — L23.4 ALLERGIC CONTACT DERMATITIS DUE TO DYES: Primary | ICD-10-CM

## 2024-04-15 DIAGNOSIS — L23.4 ALLERGIC CONTACT DERMATITIS DUE TO DYES: Primary | ICD-10-CM

## 2024-04-15 DIAGNOSIS — Z00.6 EXAMINATION OF PARTICIPANT IN CLINICAL TRIAL: ICD-10-CM

## 2024-04-16 DIAGNOSIS — Z00.6 EXAMINATION OF PARTICIPANT IN CLINICAL TRIAL: ICD-10-CM

## 2024-04-16 DIAGNOSIS — L23.4 ALLERGIC CONTACT DERMATITIS DUE TO DYES: ICD-10-CM

## 2024-04-17 DIAGNOSIS — Z00.6 EXAMINATION OF PARTICIPANT IN CLINICAL TRIAL: ICD-10-CM

## 2024-04-17 DIAGNOSIS — L23.4 ALLERGIC CONTACT DERMATITIS DUE TO DYES: Primary | ICD-10-CM

## 2024-04-26 DIAGNOSIS — L23.4 ALLERGIC CONTACT DERMATITIS DUE TO DYES: Primary | ICD-10-CM

## 2024-04-26 RX ORDER — TRIAMCINOLONE ACETONIDE 1 MG/G
CREAM TOPICAL 2 TIMES DAILY
Qty: 15 G | Refills: 2 | Status: SHIPPED | OUTPATIENT
Start: 2024-04-26